# Patient Record
Sex: MALE | Race: WHITE | NOT HISPANIC OR LATINO | URBAN - METROPOLITAN AREA
[De-identification: names, ages, dates, MRNs, and addresses within clinical notes are randomized per-mention and may not be internally consistent; named-entity substitution may affect disease eponyms.]

---

## 2022-04-29 ENCOUNTER — EMERGENCY (EMERGENCY)
Facility: HOSPITAL | Age: 22
LOS: 0 days | Discharge: HOME | End: 2022-04-29
Attending: EMERGENCY MEDICINE | Admitting: EMERGENCY MEDICINE
Payer: COMMERCIAL

## 2022-04-29 VITALS
DIASTOLIC BLOOD PRESSURE: 78 MMHG | SYSTOLIC BLOOD PRESSURE: 134 MMHG | TEMPERATURE: 98 F | OXYGEN SATURATION: 99 % | WEIGHT: 162.04 LBS | HEART RATE: 89 BPM | RESPIRATION RATE: 16 BRPM

## 2022-04-29 DIAGNOSIS — R35.0 FREQUENCY OF MICTURITION: ICD-10-CM

## 2022-04-29 LAB
ALBUMIN SERPL ELPH-MCNC: 5.2 G/DL — SIGNIFICANT CHANGE UP (ref 3.5–5.2)
ALP SERPL-CCNC: 79 U/L — SIGNIFICANT CHANGE UP (ref 30–115)
ALT FLD-CCNC: 50 U/L — HIGH (ref 0–41)
ANION GAP SERPL CALC-SCNC: 12 MMOL/L — SIGNIFICANT CHANGE UP (ref 7–14)
APPEARANCE UR: CLEAR — SIGNIFICANT CHANGE UP
AST SERPL-CCNC: 25 U/L — SIGNIFICANT CHANGE UP (ref 0–41)
BASOPHILS # BLD AUTO: 0.1 K/UL — SIGNIFICANT CHANGE UP (ref 0–0.2)
BASOPHILS NFR BLD AUTO: 1.4 % — HIGH (ref 0–1)
BILIRUB SERPL-MCNC: 0.9 MG/DL — SIGNIFICANT CHANGE UP (ref 0.2–1.2)
BILIRUB UR-MCNC: NEGATIVE — SIGNIFICANT CHANGE UP
BUN SERPL-MCNC: 16 MG/DL — SIGNIFICANT CHANGE UP (ref 10–20)
CALCIUM SERPL-MCNC: 9.6 MG/DL — SIGNIFICANT CHANGE UP (ref 8.5–10.1)
CHLORIDE SERPL-SCNC: 99 MMOL/L — SIGNIFICANT CHANGE UP (ref 98–110)
CO2 SERPL-SCNC: 28 MMOL/L — SIGNIFICANT CHANGE UP (ref 17–32)
COLOR SPEC: YELLOW — SIGNIFICANT CHANGE UP
CREAT SERPL-MCNC: 1 MG/DL — SIGNIFICANT CHANGE UP (ref 0.7–1.5)
DIFF PNL FLD: NEGATIVE — SIGNIFICANT CHANGE UP
EGFR: 108 ML/MIN/1.73M2 — SIGNIFICANT CHANGE UP
EOSINOPHIL # BLD AUTO: 0.13 K/UL — SIGNIFICANT CHANGE UP (ref 0–0.7)
EOSINOPHIL NFR BLD AUTO: 1.8 % — SIGNIFICANT CHANGE UP (ref 0–8)
GLUCOSE SERPL-MCNC: 85 MG/DL — SIGNIFICANT CHANGE UP (ref 70–99)
GLUCOSE UR QL: NEGATIVE MG/DL — SIGNIFICANT CHANGE UP
HCT VFR BLD CALC: 43.5 % — SIGNIFICANT CHANGE UP (ref 42–52)
HGB BLD-MCNC: 15.5 G/DL — SIGNIFICANT CHANGE UP (ref 14–18)
IMM GRANULOCYTES NFR BLD AUTO: 0.3 % — SIGNIFICANT CHANGE UP (ref 0.1–0.3)
KETONES UR-MCNC: NEGATIVE — SIGNIFICANT CHANGE UP
LEUKOCYTE ESTERASE UR-ACNC: NEGATIVE — SIGNIFICANT CHANGE UP
LYMPHOCYTES # BLD AUTO: 3.19 K/UL — SIGNIFICANT CHANGE UP (ref 1.2–3.4)
LYMPHOCYTES # BLD AUTO: 43.5 % — SIGNIFICANT CHANGE UP (ref 20.5–51.1)
MCHC RBC-ENTMCNC: 29.7 PG — SIGNIFICANT CHANGE UP (ref 27–31)
MCHC RBC-ENTMCNC: 35.6 G/DL — SIGNIFICANT CHANGE UP (ref 32–37)
MCV RBC AUTO: 83.3 FL — SIGNIFICANT CHANGE UP (ref 80–94)
MONOCYTES # BLD AUTO: 0.59 K/UL — SIGNIFICANT CHANGE UP (ref 0.1–0.6)
MONOCYTES NFR BLD AUTO: 8 % — SIGNIFICANT CHANGE UP (ref 1.7–9.3)
NEUTROPHILS # BLD AUTO: 3.3 K/UL — SIGNIFICANT CHANGE UP (ref 1.4–6.5)
NEUTROPHILS NFR BLD AUTO: 45 % — SIGNIFICANT CHANGE UP (ref 42.2–75.2)
NITRITE UR-MCNC: NEGATIVE — SIGNIFICANT CHANGE UP
NRBC # BLD: 0 /100 WBCS — SIGNIFICANT CHANGE UP (ref 0–0)
PH UR: 6.5 — SIGNIFICANT CHANGE UP (ref 5–8)
PLATELET # BLD AUTO: 247 K/UL — SIGNIFICANT CHANGE UP (ref 130–400)
POTASSIUM SERPL-MCNC: 4 MMOL/L — SIGNIFICANT CHANGE UP (ref 3.5–5)
POTASSIUM SERPL-SCNC: 4 MMOL/L — SIGNIFICANT CHANGE UP (ref 3.5–5)
PROT SERPL-MCNC: 7.3 G/DL — SIGNIFICANT CHANGE UP (ref 6–8)
PROT UR-MCNC: NEGATIVE MG/DL — SIGNIFICANT CHANGE UP
RBC # BLD: 5.22 M/UL — SIGNIFICANT CHANGE UP (ref 4.7–6.1)
RBC # FLD: 12.8 % — SIGNIFICANT CHANGE UP (ref 11.5–14.5)
SODIUM SERPL-SCNC: 139 MMOL/L — SIGNIFICANT CHANGE UP (ref 135–146)
SP GR SPEC: >=1.03 (ref 1.01–1.03)
UROBILINOGEN FLD QL: 0.2 MG/DL — SIGNIFICANT CHANGE UP
WBC # BLD: 7.33 K/UL — SIGNIFICANT CHANGE UP (ref 4.8–10.8)
WBC # FLD AUTO: 7.33 K/UL — SIGNIFICANT CHANGE UP (ref 4.8–10.8)

## 2022-04-29 PROCEDURE — 99284 EMERGENCY DEPT VISIT MOD MDM: CPT

## 2022-04-29 NOTE — ED PROVIDER NOTE - ATTENDING APP SHARED VISIT CONTRIBUTION OF CARE
20 yo M presents for evaluation of urinary frequency over last month. no dysuria, no excessive thirst but has been drinking caffeine shots and energy drinks. no abd pain. Pt requesting STD testing, On exam pt in NAD AAO x 3, Op clear, MMM, Lungs cta b/l, abd soft nt nd, no edema, no rash

## 2022-04-29 NOTE — ED PROVIDER NOTE - CLINICAL SUMMARY MEDICAL DECISION MAKING FREE TEXT BOX
labs obtained.  Results reviewed and d/w pt.  Pt to decrease caffeine intake.  will follow up with his PMD

## 2022-04-29 NOTE — ED PROVIDER NOTE - PATIENT PORTAL LINK FT
You can access the FollowMyHealth Patient Portal offered by [facility] by registering at the following website: [email or web address]. By joining [employer]’s FollowMyHealth portal, you will also be able to view your health information using other applications (apps) compatible with our system.

## 2022-04-29 NOTE — ED PROVIDER NOTE - NSFOLLOWUPCLINICS_GEN_ALL_ED_FT
[facility]  Infectious Diseases  [address] [place] [address]  Phone: [phone]  Fax: [phone]

## 2022-04-29 NOTE — ED PROVIDER NOTE - PHYSICAL EXAMINATION
Vital Signs: I have reviewed the initial vital signs.  Constitutional: well-nourished, no acute distress, normocephalic  Eyes: PERRLA, EOMI, no nystagmus, clear conjunctiva  Respiratory: unlabored respiratory effort, clear to auscultation bilaterally  Gastrointestinal: soft, non-tender, non-distended  abdomen, no pulsatile mass  Musculoskeletal: supple neck, no cva tenderness, no bony tenderness  Integumentary: warm, dry, no rash  Neurologic: awake, alert, cranial nerves II-XII grossly intact, extremities’ motor and sensory functions grossly intact, no focal deficits,

## 2022-04-29 NOTE — ED PROVIDER NOTE - OBJECTIVE STATEMENT
20 y/o male presents to the ED with urinary frequency over past two months . patient has been drinking coffee and energy caffeine drinks daily. patient denies any abdominal or back pain. no penile discharge or dysuria.

## 2022-04-29 NOTE — ED PROVIDER NOTE - NS ED ATTENDING STATEMENT MOD
This was a shared visit with the MARIA GUADALUPE. I reviewed and verified the documentation and independently performed the documented:

## 2022-04-30 LAB — T PALLIDUM AB TITR SER: NEGATIVE — SIGNIFICANT CHANGE UP

## 2022-05-02 LAB
C TRACH RRNA SPEC QL NAA+PROBE: SIGNIFICANT CHANGE UP
N GONORRHOEA RRNA SPEC QL NAA+PROBE: SIGNIFICANT CHANGE UP
SPECIMEN SOURCE: SIGNIFICANT CHANGE UP

## 2023-08-05 ENCOUNTER — INPATIENT (INPATIENT)
Facility: HOSPITAL | Age: 23
LOS: 2 days | Discharge: HOME | DRG: 371 | End: 2023-08-08
Attending: STUDENT IN AN ORGANIZED HEALTH CARE EDUCATION/TRAINING PROGRAM | Admitting: STUDENT IN AN ORGANIZED HEALTH CARE EDUCATION/TRAINING PROGRAM
Payer: COMMERCIAL

## 2023-08-05 VITALS
DIASTOLIC BLOOD PRESSURE: 75 MMHG | HEART RATE: 109 BPM | SYSTOLIC BLOOD PRESSURE: 134 MMHG | RESPIRATION RATE: 20 BRPM | HEIGHT: 72 IN | WEIGHT: 151.02 LBS | TEMPERATURE: 98 F | OXYGEN SATURATION: 99 %

## 2023-08-05 LAB
ALBUMIN SERPL ELPH-MCNC: 4.3 G/DL — SIGNIFICANT CHANGE UP (ref 3.5–5)
ALP SERPL-CCNC: 69 U/L — SIGNIFICANT CHANGE UP (ref 40–120)
ALT FLD-CCNC: 52 U/L DA — SIGNIFICANT CHANGE UP (ref 10–60)
ANION GAP SERPL CALC-SCNC: 6 MMOL/L — SIGNIFICANT CHANGE UP (ref 5–17)
APPEARANCE UR: CLEAR — SIGNIFICANT CHANGE UP
APTT BLD: 28.9 SEC — SIGNIFICANT CHANGE UP (ref 24.5–35.6)
AST SERPL-CCNC: 41 U/L — HIGH (ref 10–40)
BACTERIA # UR AUTO: ABNORMAL /HPF
BASOPHILS # BLD AUTO: 0.11 K/UL — SIGNIFICANT CHANGE UP (ref 0–0.2)
BASOPHILS NFR BLD AUTO: 1.2 % — SIGNIFICANT CHANGE UP (ref 0–2)
BILIRUB SERPL-MCNC: 1.3 MG/DL — HIGH (ref 0.2–1.2)
BILIRUB UR-MCNC: NEGATIVE — SIGNIFICANT CHANGE UP
BUN SERPL-MCNC: 23 MG/DL — HIGH (ref 7–18)
CALCIUM SERPL-MCNC: 9.5 MG/DL — SIGNIFICANT CHANGE UP (ref 8.4–10.5)
CHLORIDE SERPL-SCNC: 106 MMOL/L — SIGNIFICANT CHANGE UP (ref 96–108)
CO2 SERPL-SCNC: 26 MMOL/L — SIGNIFICANT CHANGE UP (ref 22–31)
COLOR SPEC: YELLOW — SIGNIFICANT CHANGE UP
CREAT SERPL-MCNC: 1.08 MG/DL — SIGNIFICANT CHANGE UP (ref 0.5–1.3)
DIFF PNL FLD: NEGATIVE — SIGNIFICANT CHANGE UP
EGFR: 98 ML/MIN/1.73M2 — SIGNIFICANT CHANGE UP
EOSINOPHIL # BLD AUTO: 0.03 K/UL — SIGNIFICANT CHANGE UP (ref 0–0.5)
EOSINOPHIL NFR BLD AUTO: 0.3 % — SIGNIFICANT CHANGE UP (ref 0–6)
EPI CELLS # UR: ABNORMAL /HPF
GLUCOSE SERPL-MCNC: 95 MG/DL — SIGNIFICANT CHANGE UP (ref 70–99)
GLUCOSE UR QL: NEGATIVE — SIGNIFICANT CHANGE UP
HCT VFR BLD CALC: 40.7 % — SIGNIFICANT CHANGE UP (ref 39–50)
HGB BLD-MCNC: 14.3 G/DL — SIGNIFICANT CHANGE UP (ref 13–17)
IMM GRANULOCYTES NFR BLD AUTO: 0.1 % — SIGNIFICANT CHANGE UP (ref 0–0.9)
INR BLD: 1 RATIO — SIGNIFICANT CHANGE UP (ref 0.85–1.18)
KETONES UR-MCNC: ABNORMAL
LEUKOCYTE ESTERASE UR-ACNC: NEGATIVE — SIGNIFICANT CHANGE UP
LIDOCAIN IGE QN: 366 U/L — SIGNIFICANT CHANGE UP (ref 73–393)
LYMPHOCYTES # BLD AUTO: 2.93 K/UL — SIGNIFICANT CHANGE UP (ref 1–3.3)
LYMPHOCYTES # BLD AUTO: 32.7 % — SIGNIFICANT CHANGE UP (ref 13–44)
MCHC RBC-ENTMCNC: 29.4 PG — SIGNIFICANT CHANGE UP (ref 27–34)
MCHC RBC-ENTMCNC: 35.1 GM/DL — SIGNIFICANT CHANGE UP (ref 32–36)
MCV RBC AUTO: 83.7 FL — SIGNIFICANT CHANGE UP (ref 80–100)
MONOCYTES # BLD AUTO: 0.9 K/UL — SIGNIFICANT CHANGE UP (ref 0–0.9)
MONOCYTES NFR BLD AUTO: 10 % — SIGNIFICANT CHANGE UP (ref 2–14)
NEUTROPHILS # BLD AUTO: 4.98 K/UL — SIGNIFICANT CHANGE UP (ref 1.8–7.4)
NEUTROPHILS NFR BLD AUTO: 55.7 % — SIGNIFICANT CHANGE UP (ref 43–77)
NITRITE UR-MCNC: NEGATIVE — SIGNIFICANT CHANGE UP
NRBC # BLD: 0 /100 WBCS — SIGNIFICANT CHANGE UP (ref 0–0)
PH UR: 6.5 — SIGNIFICANT CHANGE UP (ref 5–8)
PLATELET # BLD AUTO: 271 K/UL — SIGNIFICANT CHANGE UP (ref 150–400)
POTASSIUM SERPL-MCNC: 3.3 MMOL/L — LOW (ref 3.5–5.3)
POTASSIUM SERPL-SCNC: 3.3 MMOL/L — LOW (ref 3.5–5.3)
PROT SERPL-MCNC: 7.8 G/DL — SIGNIFICANT CHANGE UP (ref 6–8.3)
PROT UR-MCNC: 15 MG/DL
PROTHROM AB SERPL-ACNC: 11.4 SEC — SIGNIFICANT CHANGE UP (ref 9.5–13)
RBC # BLD: 4.86 M/UL — SIGNIFICANT CHANGE UP (ref 4.2–5.8)
RBC # FLD: 12.8 % — SIGNIFICANT CHANGE UP (ref 10.3–14.5)
RBC CASTS # UR COMP ASSIST: SIGNIFICANT CHANGE UP /HPF (ref 0–2)
SODIUM SERPL-SCNC: 138 MMOL/L — SIGNIFICANT CHANGE UP (ref 135–145)
SP GR SPEC: 1.01 — SIGNIFICANT CHANGE UP (ref 1.01–1.02)
UROBILINOGEN FLD QL: NEGATIVE — SIGNIFICANT CHANGE UP
WBC # BLD: 8.96 K/UL — SIGNIFICANT CHANGE UP (ref 3.8–10.5)
WBC # FLD AUTO: 8.96 K/UL — SIGNIFICANT CHANGE UP (ref 3.8–10.5)
WBC UR QL: SIGNIFICANT CHANGE UP /HPF (ref 0–5)

## 2023-08-05 PROCEDURE — 74177 CT ABD & PELVIS W/CONTRAST: CPT | Mod: 26,MA

## 2023-08-05 PROCEDURE — 99285 EMERGENCY DEPT VISIT HI MDM: CPT

## 2023-08-05 RX ORDER — ONDANSETRON 8 MG/1
4 TABLET, FILM COATED ORAL ONCE
Refills: 0 | Status: COMPLETED | OUTPATIENT
Start: 2023-08-05 | End: 2023-08-05

## 2023-08-05 RX ORDER — SODIUM CHLORIDE 9 MG/ML
1000 INJECTION INTRAMUSCULAR; INTRAVENOUS; SUBCUTANEOUS ONCE
Refills: 0 | Status: COMPLETED | OUTPATIENT
Start: 2023-08-05 | End: 2023-08-05

## 2023-08-05 RX ORDER — MORPHINE SULFATE 50 MG/1
4 CAPSULE, EXTENDED RELEASE ORAL ONCE
Refills: 0 | Status: DISCONTINUED | OUTPATIENT
Start: 2023-08-05 | End: 2023-08-05

## 2023-08-05 RX ORDER — PANTOPRAZOLE SODIUM 20 MG/1
40 TABLET, DELAYED RELEASE ORAL ONCE
Refills: 0 | Status: COMPLETED | OUTPATIENT
Start: 2023-08-05 | End: 2023-08-05

## 2023-08-05 RX ORDER — KETOROLAC TROMETHAMINE 30 MG/ML
30 SYRINGE (ML) INJECTION ONCE
Refills: 0 | Status: DISCONTINUED | OUTPATIENT
Start: 2023-08-05 | End: 2023-08-05

## 2023-08-05 RX ORDER — MORPHINE SULFATE 50 MG/1
2 CAPSULE, EXTENDED RELEASE ORAL ONCE
Refills: 0 | Status: DISCONTINUED | OUTPATIENT
Start: 2023-08-05 | End: 2023-08-05

## 2023-08-05 RX ORDER — FAMOTIDINE 10 MG/ML
20 INJECTION INTRAVENOUS ONCE
Refills: 0 | Status: COMPLETED | OUTPATIENT
Start: 2023-08-05 | End: 2023-08-05

## 2023-08-05 RX ORDER — SUCRALFATE 1 G
1 TABLET ORAL ONCE
Refills: 0 | Status: COMPLETED | OUTPATIENT
Start: 2023-08-05 | End: 2023-08-05

## 2023-08-05 RX ADMIN — Medication 30 MILLIGRAM(S): at 20:39

## 2023-08-05 RX ADMIN — Medication 30 MILLIGRAM(S): at 20:15

## 2023-08-05 RX ADMIN — SODIUM CHLORIDE 1000 MILLILITER(S): 9 INJECTION INTRAMUSCULAR; INTRAVENOUS; SUBCUTANEOUS at 20:15

## 2023-08-05 RX ADMIN — ONDANSETRON 4 MILLIGRAM(S): 8 TABLET, FILM COATED ORAL at 20:15

## 2023-08-05 RX ADMIN — Medication 30 MILLILITER(S): at 20:28

## 2023-08-05 RX ADMIN — MORPHINE SULFATE 2 MILLIGRAM(S): 50 CAPSULE, EXTENDED RELEASE ORAL at 21:02

## 2023-08-05 RX ADMIN — SODIUM CHLORIDE 1000 MILLILITER(S): 9 INJECTION INTRAMUSCULAR; INTRAVENOUS; SUBCUTANEOUS at 21:00

## 2023-08-05 RX ADMIN — MORPHINE SULFATE 2 MILLIGRAM(S): 50 CAPSULE, EXTENDED RELEASE ORAL at 20:43

## 2023-08-05 RX ADMIN — MORPHINE SULFATE 4 MILLIGRAM(S): 50 CAPSULE, EXTENDED RELEASE ORAL at 23:50

## 2023-08-05 RX ADMIN — Medication 1 GRAM(S): at 20:28

## 2023-08-05 RX ADMIN — FAMOTIDINE 20 MILLIGRAM(S): 10 INJECTION INTRAVENOUS at 20:29

## 2023-08-05 NOTE — ED ADULT NURSE NOTE - OBJECTIVE STATEMENT
pt reports of left sided abdominal pain radiating to left flank area with painful urination x a month but got worse today. hx of inguinal hernia. [id number] ao4. facial grimacing and guarding noted. abdomen soft, tender to left abd area.

## 2023-08-05 NOTE — ED PROVIDER NOTE - CLINICAL SUMMARY MEDICAL DECISION MAKING FREE TEXT BOX
Patient presenting with abd pain. will obtain lab, ct, r/o stone, surgical abd. pain control and reassess

## 2023-08-05 NOTE — ED PROVIDER NOTE - OBJECTIVE STATEMENT
22 y.o presenting with left sided abd pain intermittent x 1 month but worse today. denies n, v, fever, dysuria, hematuria, diarrhea. denies pmh or psh

## 2023-08-05 NOTE — ED PROVIDER NOTE - PROGRESS NOTE DETAILS
Sabrina: ct with ileus vs early obstruction. surgery consulted- advised unlikely sbo, no need for surgical admission, needs GI eval. with morphine pain is improved, but then pain returning. will admit for eval for abd pain.

## 2023-08-06 DIAGNOSIS — R10.9 UNSPECIFIED ABDOMINAL PAIN: ICD-10-CM

## 2023-08-06 DIAGNOSIS — Z98.890 OTHER SPECIFIED POSTPROCEDURAL STATES: Chronic | ICD-10-CM

## 2023-08-06 DIAGNOSIS — E87.6 HYPOKALEMIA: ICD-10-CM

## 2023-08-06 DIAGNOSIS — Z92.241 PERSONAL HISTORY OF SYSTEMIC STEROID THERAPY: ICD-10-CM

## 2023-08-06 DIAGNOSIS — R94.31 ABNORMAL ELECTROCARDIOGRAM [ECG] [EKG]: ICD-10-CM

## 2023-08-06 DIAGNOSIS — Z29.9 ENCOUNTER FOR PROPHYLACTIC MEASURES, UNSPECIFIED: ICD-10-CM

## 2023-08-06 DIAGNOSIS — F10.10 ALCOHOL ABUSE, UNCOMPLICATED: ICD-10-CM

## 2023-08-06 PROBLEM — Z78.9 OTHER SPECIFIED HEALTH STATUS: Chronic | Status: ACTIVE | Noted: 2022-04-29

## 2023-08-06 LAB
ANION GAP SERPL CALC-SCNC: 7 MMOL/L — SIGNIFICANT CHANGE UP (ref 5–17)
BUN SERPL-MCNC: 14 MG/DL — SIGNIFICANT CHANGE UP (ref 7–18)
CALCIUM SERPL-MCNC: 8.6 MG/DL — SIGNIFICANT CHANGE UP (ref 8.4–10.5)
CHLORIDE SERPL-SCNC: 109 MMOL/L — HIGH (ref 96–108)
CO2 SERPL-SCNC: 26 MMOL/L — SIGNIFICANT CHANGE UP (ref 22–31)
CREAT SERPL-MCNC: 0.91 MG/DL — SIGNIFICANT CHANGE UP (ref 0.5–1.3)
EGFR: 121 ML/MIN/1.73M2 — SIGNIFICANT CHANGE UP
ERYTHROCYTE [SEDIMENTATION RATE] IN BLOOD: 7 MM/HR — SIGNIFICANT CHANGE UP (ref 0–15)
ETHANOL SERPL-MCNC: <3 MG/DL — SIGNIFICANT CHANGE UP (ref 0–10)
GLUCOSE SERPL-MCNC: 120 MG/DL — HIGH (ref 70–99)
HCT VFR BLD CALC: 42.2 % — SIGNIFICANT CHANGE UP (ref 39–50)
HGB BLD-MCNC: 14.5 G/DL — SIGNIFICANT CHANGE UP (ref 13–17)
MCHC RBC-ENTMCNC: 29.2 PG — SIGNIFICANT CHANGE UP (ref 27–34)
MCHC RBC-ENTMCNC: 34.4 GM/DL — SIGNIFICANT CHANGE UP (ref 32–36)
MCV RBC AUTO: 85.1 FL — SIGNIFICANT CHANGE UP (ref 80–100)
NRBC # BLD: 0 /100 WBCS — SIGNIFICANT CHANGE UP (ref 0–0)
PLATELET # BLD AUTO: 250 K/UL — SIGNIFICANT CHANGE UP (ref 150–400)
POTASSIUM SERPL-MCNC: 3.5 MMOL/L — SIGNIFICANT CHANGE UP (ref 3.5–5.3)
POTASSIUM SERPL-SCNC: 3.5 MMOL/L — SIGNIFICANT CHANGE UP (ref 3.5–5.3)
RBC # BLD: 4.96 M/UL — SIGNIFICANT CHANGE UP (ref 4.2–5.8)
RBC # FLD: 13.2 % — SIGNIFICANT CHANGE UP (ref 10.3–14.5)
SODIUM SERPL-SCNC: 142 MMOL/L — SIGNIFICANT CHANGE UP (ref 135–145)
WBC # BLD: 5.35 K/UL — SIGNIFICANT CHANGE UP (ref 3.8–10.5)
WBC # FLD AUTO: 5.35 K/UL — SIGNIFICANT CHANGE UP (ref 3.8–10.5)

## 2023-08-06 PROCEDURE — 93010 ELECTROCARDIOGRAM REPORT: CPT | Mod: 76

## 2023-08-06 PROCEDURE — 99222 1ST HOSP IP/OBS MODERATE 55: CPT | Mod: GC

## 2023-08-06 RX ORDER — ONDANSETRON 8 MG/1
4 TABLET, FILM COATED ORAL EVERY 6 HOURS
Refills: 0 | Status: DISCONTINUED | OUTPATIENT
Start: 2023-08-06 | End: 2023-08-06

## 2023-08-06 RX ORDER — SUCRALFATE 1 G
1 TABLET ORAL
Refills: 0 | Status: DISCONTINUED | OUTPATIENT
Start: 2023-08-06 | End: 2023-08-08

## 2023-08-06 RX ORDER — MORPHINE SULFATE 50 MG/1
2 CAPSULE, EXTENDED RELEASE ORAL EVERY 6 HOURS
Refills: 0 | Status: DISCONTINUED | OUTPATIENT
Start: 2023-08-06 | End: 2023-08-08

## 2023-08-06 RX ORDER — ACETAMINOPHEN 500 MG
1000 TABLET ORAL ONCE
Refills: 0 | Status: COMPLETED | OUTPATIENT
Start: 2023-08-06 | End: 2023-08-06

## 2023-08-06 RX ORDER — SUCRALFATE 1 G
1 TABLET ORAL ONCE
Refills: 0 | Status: COMPLETED | OUTPATIENT
Start: 2023-08-06 | End: 2023-08-06

## 2023-08-06 RX ORDER — PANTOPRAZOLE SODIUM 20 MG/1
40 TABLET, DELAYED RELEASE ORAL EVERY 12 HOURS
Refills: 0 | Status: DISCONTINUED | OUTPATIENT
Start: 2023-08-06 | End: 2023-08-08

## 2023-08-06 RX ORDER — POTASSIUM CHLORIDE 20 MEQ
20 PACKET (EA) ORAL ONCE
Refills: 0 | Status: COMPLETED | OUTPATIENT
Start: 2023-08-06 | End: 2023-08-06

## 2023-08-06 RX ORDER — ACETAMINOPHEN 500 MG
650 TABLET ORAL EVERY 6 HOURS
Refills: 0 | Status: DISCONTINUED | OUTPATIENT
Start: 2023-08-06 | End: 2023-08-08

## 2023-08-06 RX ADMIN — Medication 650 MILLIGRAM(S): at 20:44

## 2023-08-06 RX ADMIN — PANTOPRAZOLE SODIUM 40 MILLIGRAM(S): 20 TABLET, DELAYED RELEASE ORAL at 00:15

## 2023-08-06 RX ADMIN — MORPHINE SULFATE 2 MILLIGRAM(S): 50 CAPSULE, EXTENDED RELEASE ORAL at 23:51

## 2023-08-06 RX ADMIN — Medication 650 MILLIGRAM(S): at 22:11

## 2023-08-06 RX ADMIN — Medication 1 GRAM(S): at 13:54

## 2023-08-06 RX ADMIN — PANTOPRAZOLE SODIUM 40 MILLIGRAM(S): 20 TABLET, DELAYED RELEASE ORAL at 05:19

## 2023-08-06 RX ADMIN — MORPHINE SULFATE 2 MILLIGRAM(S): 50 CAPSULE, EXTENDED RELEASE ORAL at 17:25

## 2023-08-06 RX ADMIN — Medication 1 GRAM(S): at 17:08

## 2023-08-06 RX ADMIN — Medication 20 MILLIEQUIVALENT(S): at 05:19

## 2023-08-06 RX ADMIN — MORPHINE SULFATE 2 MILLIGRAM(S): 50 CAPSULE, EXTENDED RELEASE ORAL at 10:07

## 2023-08-06 RX ADMIN — MORPHINE SULFATE 2 MILLIGRAM(S): 50 CAPSULE, EXTENDED RELEASE ORAL at 09:53

## 2023-08-06 RX ADMIN — Medication 1 GRAM(S): at 23:27

## 2023-08-06 RX ADMIN — PANTOPRAZOLE SODIUM 40 MILLIGRAM(S): 20 TABLET, DELAYED RELEASE ORAL at 17:08

## 2023-08-06 RX ADMIN — MORPHINE SULFATE 4 MILLIGRAM(S): 50 CAPSULE, EXTENDED RELEASE ORAL at 00:11

## 2023-08-06 RX ADMIN — Medication 400 MILLIGRAM(S): at 13:54

## 2023-08-06 RX ADMIN — Medication 1000 MILLIGRAM(S): at 14:13

## 2023-08-06 RX ADMIN — MORPHINE SULFATE 2 MILLIGRAM(S): 50 CAPSULE, EXTENDED RELEASE ORAL at 17:08

## 2023-08-06 RX ADMIN — FAMOTIDINE 20 MILLIGRAM(S): 10 INJECTION INTRAVENOUS at 00:16

## 2023-08-06 NOTE — H&P ADULT - PROBLEM SELECTOR PLAN 2
p/w mild hypoK of 3.3  will provide PO KCl   monitor CMP p/w mild hypoK of 3.3  will provide PO KCl 20  monitor CMP

## 2023-08-06 NOTE — H&P ADULT - PROBLEM SELECTOR PLAN 3
hx of testosterone use for 7 months from outside market, sudden quit   loss of muscle mass and energy , with decrease exercise tolerance  [studies show low testosterone correlation with IBS]   f/u total testosterone in AM

## 2023-08-06 NOTE — CONSULT NOTE ADULT - ASSESSMENT
22 y.o male with no sign PMHx or PSHx presents to the ED with Abd pain x 1 month . Admits pain to be sharp, severe,  mostly  epigastric/periumbilical relieved by PPIs and food but  denies nausea or vomiting. Pt is in the ED today because pt is getting increasingly worse . 22 y.o male with no sign PMHx or PSHx presents to the ED with Abd pain x 1 month . Admits pain to be sharp, severe,  mostly  epigastric/periumbilical relieved by PPIs and food but  denies nausea or vomiting. Pt is in the ED today because pt is getting increasingly worse . Afebrile , Chronic Abd pain relieved by food or PPI . Suspect PUD . No SBO otr Ileus . Normal caliber BM     No immediate surgical intervention   Suggest GI cocktails   PO trial /challenge   Will benefit from GI consult   Other care pre primary team  Reconsult surgery prn

## 2023-08-06 NOTE — H&P ADULT - NSHPPHYSICALEXAM_GEN_ALL_CORE
GENERAL: NAD, lying in bed comfortably  HEAD:  Atraumatic, Normocephalic  EYES: EOMI, PERRLA, conjunctiva and sclera clear  ENT: Moist mucous membranes  NECK: Supple, No JVD  CHEST/LUNG: Clear to auscultation bilaterally; No rales, rhonchi, wheezing, or rubs. Unlabored respirations  HEART: Regular rate and rhythm; No murmurs, rubs, or gallops  ABDOMEN: Bowel sounds present; Soft, Nontender, Nondistended, scaphoid abdomen   EXTREMITIES:  2+ Peripheral Pulses, brisk capillary refill. No clubbing, cyanosis, or edema  NERVOUS SYSTEM:  Alert & Oriented X3, speech clear. 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

## 2023-08-06 NOTE — H&P ADULT - PROBLEM SELECTOR PLAN 1
p/w epigastric and LUQ, soft stool, loss of weight  in the ED: afebrile, , /75, Sat 99% on RA  endorsed using Testosterone injection daily from OCT 2022 for 7 months with sudden quit   no leukocytosis, neg lipase,   LFTs relatively normal   CT scan: Mildly dilated small bowel loops without abrupt transition point identified, may reflect ileus.  likely PUD [duodenal] 2/2 H. pylor vs Gastritis vs IBS 2/2 steroids use and quit hormonal therapy  vs SMA syndrome  [studies show low testosterone correlation with IBS]   s/p Pepcid, Toradol and Morphine   c/w PPI PO BID  f/u H. pylori Ab in stool and blood   f/u stool culture, ova and parasite  Surgery consulted by ED - no surgical intervention at this time   GI consult in AM for possible EGD p/w epigastric and LUQ, soft stool, loss of weight  in the ED: afebrile, , /75, Sat 99% on RA  endorsed using Testosterone injection daily from OCT 2022 for 7 months with sudden quit   no leukocytosis, neg lipase,   LFTs relatively normal   CT scan: Mildly dilated small bowel loops without abrupt transition point identified, may reflect ileus.  likely PUD [duodenal] 2/2 H. pylor vs Gastritis vs IBS 2/2 steroids use and quit hormonal therapy  vs SMA syndrome  [studies show low testosterone correlation with IBS]   s/p Pepcid, Toradol and Morphine   c/w PPI PO BID  will hold off on Zofran as EKG showed QTc of 520 - to reassess with EKG if antiemetic required   f/u H. pylori Ab in stool and blood   f/u stool culture, ova and parasite  Surgery consulted by ED - no surgical intervention at this time   GI consult in AM for possible EGD p/w epigastric and LUQ pain , soft stool, loss of weight  in the ED: afebrile, , /75, Sat 99% on RA  endorsed using Testosterone injection daily from OCT 2022 for 7 months with sudden quit   no leukocytosis, neg lipase,   LFTs relatively normal   CT scan: Mildly dilated small bowel loops without abrupt transition point identified, may reflect ileus.  likely PUD [duodenal] 2/2 H. pylori vs Gastritis vs IBS 2/2 steroids use and quit hormonal therapy  vs SMA syndrome  [studies show low testosterone correlation with IBS]   s/p Pepcid, Toradol and Morphine   c/w PPI PO BID  will hold off on Zofran as EKG showed QTc of 520 - to reassess with EKG if antiemetic required   f/u H. pylori Ab in stool and blood   f/u stool culture, ova and parasite  Surgery consulted by ED - no surgical intervention at this time   GI consult in AM for possible EGD

## 2023-08-06 NOTE — H&P ADULT - PROBLEM SELECTOR PLAN 4
DVT - SCD hx of heavy alcohol drinking in the past, cut down to 3 shots of Vodka and 3 bottles of bear on weekends  started drinking beer 1-3 bottles daily in the past 5 days   ciwa protocol with PRN ativan 2 mg IV every 1 hr for CIWA score greater than 8   f/u alcohol blood in AM hx of heavy alcohol drinking in the past, cut down to 3 shots of Vodka and 3 bottles of bear on weekends  started drinking beer 1-3 bottles daily in the past 5 days   not in withdrawal, CIWA score 0 as per writer's exam   ciwa protocol with PRN ativan 2 mg IV every 1 hr for CIWA score greater than 8   f/u alcohol blood in AM EKG showed QTc of 520   s/p 2 doses of Zofran in ED  hx of palpitation in childhood requiring evaluation with Holter monitoring  questionable if Zofran alone would cause prolongation   will repeat EKG in AM  will hold off on Antiemetics or other drugs causing QTc prolongation such as Zofran, Metoclopramide  may consider Ativan or other antiemetic based on pharmacy availability EKG showed QTc of 520   s/p 2 doses of Zofran in ED  hx of palpitation in childhood requiring evaluation with Holter monitoring  questionable if Zofran alone would cause prolongation   will repeat EKG in AM  will hold off on Antiemetics or other drugs causing QTc prolongation such as Zofran, Metoclopramide  may consider Ativan or other antiemetic based on pharmacy availability  Consider outpatient cardiology follow up if qtc remains elevated

## 2023-08-06 NOTE — PATIENT PROFILE ADULT - NSTOBACCOQUITATTEMPT_GEN_A_CORE_SD
Stopped smoking cigarettes 11 years ago. Now vapes "24x7"/nicotine replacement therapy/tobacco cessation program

## 2023-08-06 NOTE — CONSULT NOTE ADULT - NS ATTEND AMEND GEN_ALL_CORE FT
Clinical history most consistent with PUD.   Continue PPI    GI consult for endosopy - in vs outpatient  Would d/c narcotics

## 2023-08-06 NOTE — H&P ADULT - HISTORY OF PRESENT ILLNESS
22 yrs old M, with pmhx of alcohol abuse, pshx hernia repair in childhood, presented with epigastrica and LUQ abdominal pain. Pt stated that the pain started about 2 months ago, explained as stabbing pain, mainly at the epigastric area with gradual worsening with increased in intensity and number of episodes, relatively improved with food, not controlled with Tylenol. He stated that the episode he experienced today was excruciating, radiating to the left lower back associated with mild nausea. Pt denied any vomiting, chest pain, headache, dizziness, urinary symptoms, constipation, hematemesis, hematochezia, melena. He reported of having multiple soft stools about 5 times daily for the same duration of time, along with loss of weight about 30 pounds in the last month. 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 Pt stated that the pain started about 2 months ago, explained as stabbing pain, mainly at the epigastric area with gradual worsening with increased in intensity and number of episodes, relatively improved with food, not controlled with Tylenol. He stated that the episode he experienced today was excruciating, radiating to the left lower back associated with mild nausea. Pt denied any vomiting, chest pain, headache, dizziness, urinary symptoms, constipation, hematemesis, hematochezia, melena. He reported of having multiple soft stools about 5 times daily for the same duration of time, along with loss of weight about 30 pounds in the last month. 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 He went on a Cruise to [place] about 5 months ago and had local food. He denied any sick contacts or similar symptoms in the family but stated that his mother was diagnose with the H. pylori in the past.   He has noticed dyspnea and palpitation for the same duration of time especially when climbing up the stairs. Reported of having palpitation episodes in childhood when he had Holter monitoring twice with the latest being done when he was 15 yrs old which was inconclusive.   Pt was a former heavy alcohol drinker, however had cut down in the past couple of years, currently he uses about 3 shots of Vodka and 3 bottles of beer on the weekends, however in the past few days, he tried having beer about 1-3 beers daily for the past 4-5 days as it makes him feel full. Pt is a former smoker as well, but has been Vaping daily and using marijuana as well, denied use of other illicit drugs including Cocaine, Heroine, etc.   Pt reported of Lung cancer in the grandfather however denied any other type of cancer including gastric/colon cancer.

## 2023-08-06 NOTE — H&P ADULT - SOCIAL HISTORY: ALCOHOL USE
Pt was a former heavy alcohol drinker, however had cut down in the past couple of years, currently he uses about 3 shots of Vodka and 3 bottles of beer on the weekends, however in the past few days, he tried having beer about 1-3 beers daily for the past 4-5 days as it makes him feel full.

## 2023-08-06 NOTE — H&P ADULT - NSHPREVIEWOFSYSTEMS_GEN_ALL_CORE
REVIEW OF SYSTEMS:    CONSTITUTIONAL: No weakness, fevers or chills  EYES/ENT: No visual changes;  No vertigo or throat pain   NECK: No pain or stiffness  RESPIRATORY: No cough, wheezing, hemoptysis; + shortness of breath  CARDIOVASCULAR: No chest pain or palpitations  GASTROINTESTINAL: + abdominal or epigastric pain. + mild nausea, no vomiting, or hematemesis; No diarrhea or constipation. No melena or hematochezia.  GENITOURINARY: No dysuria, frequency or hematuria  NEUROLOGICAL: No numbness or weakness  SKIN: No itching, rashes REVIEW OF SYSTEMS:    CONSTITUTIONAL: No weakness, fevers or chills  EYES/ENT: No visual changes;  No vertigo or throat pain   NECK: No pain or stiffness  RESPIRATORY: No cough, wheezing, hemoptysis; + intermittent shortness of breath  CARDIOVASCULAR: No chest pain or palpitations  GASTROINTESTINAL: + abdominal or epigastric pain. + mild nausea, no vomiting, or hematemesis; No diarrhea or constipation. No melena or hematochezia.  GENITOURINARY: No dysuria, frequency or hematuria  NEUROLOGICAL: No numbness or weakness  SKIN: No itching, rashes

## 2023-08-06 NOTE — H&P ADULT - PROBLEM SELECTOR PLAN 5
DVT - SCD hx of heavy alcohol drinking in the past, cut down to 3 shots of Vodka and 3 bottles of bear on weekends  started drinking beer 1-3 bottles daily in the past 5 days   not in withdrawal, CIWA score 0 as per writer's exam   ciwa protocol with PRN ativan 2 mg IV every 1 hr for CIWA score greater than 8   f/u alcohol blood in AM

## 2023-08-06 NOTE — H&P ADULT - ASSESSMENT
22 yrs old M, with pmhx of alcohol abuse, pshx hernia repair in childhood, presented with epigastrica and LUQ abdominal pain. Pt is admitted for abdominal pain assessment.  22 yrs old M, with pmhx of alcohol abuse, pshx hernia repair in childhood, presented with epigastric and LUQ abdominal pain. Pt is admitted for abdominal pain assessment.

## 2023-08-06 NOTE — H&P ADULT - ATTENDING COMMENTS
Patient is a 22 year old male with hx of alcohol abuse, past surgical history of left hernia repair in childhood, presenting with epigastric and left upper quadrant pain. Patient states that he first noticed the pain about 2 months ago, however he reports the pain was excruciating today, prompting his ED visit. He describes it as a stabbing pain at the epigastric and LUQ area with radiation to the left lower back. He reports exacerbation when drinking coffee or if he has gone a long time without eating. He finds relief with food; however, the pain has left him with decreased appetite and he has noticed a 30 lb weight loss over past 2 months. He has associated nausea, but otherwise denies vomiting, chest pain, cough, diarrhea, consitpation, hematemesis, hematochezia or melena. He reports about 5 bowel movements a day, predominantly solid stool. Patient is a , predominantly working with iron. He admits to anabolic steroid, purchased from the streets, use from Oct - May in order to "get bigger and stronger" for work. He also reports going on a cruise to [place] around 5 months ago, states he predominately ate food from the cruise, but had some local food; denies any illness from that trip. In addition, he reports hx of ulcers in his mother 2/2 H. pylori. He admits to heavy etoh use, previously drinking "half a bottle of Bhavin" but now drinks about 3 vodka shots on the weekends with additional beers. He does admit to drinking several beers daily over the past week as the bloating sensation helps with his abdominal discomfort.     In the ED, patient's vitals were stable. Labs significant for hypokalemia of 3.3. CT A/P with mildly dilated small bowel loops without abrupt transition point   identified, may reflect ileus. Early/developing obstruction should be excluded clinically. ED consulted surgery who doesn't believe there to be SBO or ileus; suspecting PUD, recommends conservative management.     ROS and PE as above     Labs, imaging and ekg reviewed  EKG with NSR. Nonspecific ST-T wave abnormalities. Prolonged Qtc 523.    CT Abdomen and Pelvis w/ IV Cont   IMPRESSION:  Mildly dilated small bowel loops without abrupt transition point   identified, may reflect ileus. Early/developing obstruction should be   excluded clinically.    A/P  #Abdominal pain  #Hypokalemia  #Alcohol abuse  #Hx of anabolic steroid use  #Prophylactic measures    - Admit to medicine  - Given his history and presentation, I suspect PUD to be high on the differential. CT abd/pel Patient is a 22 year old male with hx of alcohol abuse, past surgical history of left hernia repair in childhood, presenting with epigastric and left upper quadrant pain. Patient states that he first noticed the pain about 2 months ago, however he reports the pain was excruciating today, prompting his ED visit. He describes it as a stabbing pain at the epigastric and LUQ area with radiation to the left lower back. He reports exacerbation when drinking coffee or if he has gone a long time without eating. He finds relief with food; however, the pain has left him with decreased appetite and he has noticed a 30 lb weight loss over past 2 months. He has associated nausea, but otherwise denies vomiting, chest pain, cough, diarrhea, constipation, hematemesis, hematochezia or melena. He reports about 5 bowel movements a day, predominantly solid stool. Patient is a , predominantly working with iron. He admits to anabolic steroid, purchased from the streets, use from Oct - May in order to "get bigger and stronger" for work. He also reports going on a cruise to [place] around 5 months ago, states he predominately ate food from the cruise, but had some local food; denies any illness from that trip. In addition, he reports hx of ulcers in his mother 2/2 H. pylori. He admits to heavy etoh use, previously drinking "half a bottle of Bhavin" but now drinks about 3 vodka shots on the weekends with additional beers. He does admit to drinking several beers daily over the past week as the bloating sensation helps with his abdominal discomfort.     In the ED, patient's vitals were stable. Labs significant for hypokalemia of 3.3. CT A/P with mildly dilated small bowel loops without abrupt transition point   identified, may reflect ileus. Early/developing obstruction should be excluded clinically. ED consulted surgery who doesn't believe there to be SBO or ileus; suspecting PUD, recommends conservative management.     ROS and PE as above     Labs, imaging and ekg reviewed  EKG with NSR. Nonspecific ST-T wave abnormalities. Prolonged Qtc 523.    CT Abdomen and Pelvis w/ IV Cont   IMPRESSION:  Mildly dilated small bowel loops without abrupt transition point   identified, may reflect ileus. Early/developing obstruction should be   excluded clinically.    A/P  #Abdominal pain  #Hypokalemia  #Alcohol abuse  #Hx of anabolic steroid use  #Prophylactic measures    - Admit to medicine  - Given his history and presentation, I suspect PUD to be high on the differential. CT abd/pel with mildly dilated small bowel loops without abrupt transition point. Surgery consulted by ED, r/o ileus or sbo. Patient is a 22 year old male with hx of alcohol abuse, past surgical history of left hernia repair in childhood, presenting with epigastric and left upper quadrant pain. Patient states that he first noticed the pain about 2 months ago, however he reports the pain was excruciating today, prompting his ED visit. He describes it as a stabbing pain at the epigastric and LUQ area with radiation to the left lower back. He reports exacerbation when drinking coffee or if he has gone a long time without eating. He finds relief with food; however, the pain has left him with decreased appetite and he has noticed a 30 lb weight loss over past 2 months. He has associated nausea, but otherwise denies vomiting, chest pain, cough, diarrhea, constipation, hematemesis, hematochezia or melena. He reports about 5 bowel movements a day, predominantly solid stool. Patient is a , predominantly working with iron. He admits to anabolic steroid, purchased from the streets, use from Oct - May in order to "get bigger and stronger" for work. He also reports going on a cruise to [place] around 5 months ago, states he predominately ate food from the cruise, but had some local food; denies any illness from that trip. In addition, he reports hx of ulcers in his mother 2/2 H. pylori. He admits to heavy etoh use, previously drinking "half a bottle of Bhavin" but now drinks about 3 vodka shots on the weekends with additional beers. He does admit to drinking several beers daily over the past week as the bloating sensation helps with his abdominal discomfort.     In the ED, patient's vitals were stable. Labs significant for hypokalemia of 3.3. CT A/P with mildly dilated small bowel loops without abrupt transition point   identified, may reflect ileus. Early/developing obstruction should be excluded clinically. ED consulted surgery who doesn't believe there to be SBO or ileus; suspecting PUD, recommends conservative management.     ROS and PE as above     Labs, imaging and ekg reviewed  EKG with NSR. Nonspecific ST-T wave abnormalities. Prolonged Qtc 523.    CT Abdomen and Pelvis w/ IV Cont   IMPRESSION:  Mildly dilated small bowel loops without abrupt transition point   identified, may reflect ileus. Early/developing obstruction should be   excluded clinically.    A/P  #Abdominal pain  #Hypokalemia  #Alcohol abuse  #QT prolongation  #Hx of anabolic steroid use  #Weight loss  #Prophylactic measures    - Admit to medicine  - Given his history and presentation, I suspect PUD to be high on the differentials given his risk factors of smoking, alcohol use, and family hx of H.pylori. CT abd/pel with mildly dilated small bowel loops without abrupt transition point. Surgery consulted by ED, r/o ileus or sbo. Other differentials include gastritis & IBS from hx of steroid use  - Regular diet  - Avoid additional Zofran or Reglan for now as patient with elevated QTc  - Pain control  - f/u H.pylori, stool culture ova parasites  - GI consult; (patient states he was scheduled to see GI yesterday; however, unable to attend due to work  - Monitor for signs of alcohol withdrawal. Current CIWA 0. Will not place on standing meds.   - F/u repeat EKG as QTc noted to be prolonged. Patient does report of childhood cardiac monitoring, ?SVTs. If QTc continues to be prolonged during hospitalization, consider outpatient follow up. Avoid QT prolonging meds  - Weight loss mixed etiology of decreased appetite as well as abrupt cessation of steroid use.   - Given 7 month history of steroid use with cessation in May, f/u AM testosterone.   - Monitor and replete electrolytes prn   Remainder of management as above

## 2023-08-06 NOTE — H&P ADULT - SOCIAL HISTORY: SUBSTANCE USE
Vaping daily and using marijuana as well, denied use of other illicit drugs including Cocaine, Heroine, etc.

## 2023-08-07 LAB
ALBUMIN SERPL ELPH-MCNC: 3.6 G/DL — SIGNIFICANT CHANGE UP (ref 3.5–5)
ALP SERPL-CCNC: 58 U/L — SIGNIFICANT CHANGE UP (ref 40–120)
ALT FLD-CCNC: 40 U/L DA — SIGNIFICANT CHANGE UP (ref 10–60)
ANION GAP SERPL CALC-SCNC: 5 MMOL/L — SIGNIFICANT CHANGE UP (ref 5–17)
AST SERPL-CCNC: 16 U/L — SIGNIFICANT CHANGE UP (ref 10–40)
BASOPHILS # BLD AUTO: 0.06 K/UL — SIGNIFICANT CHANGE UP (ref 0–0.2)
BASOPHILS NFR BLD AUTO: 1.5 % — SIGNIFICANT CHANGE UP (ref 0–2)
BILIRUB SERPL-MCNC: 1.1 MG/DL — SIGNIFICANT CHANGE UP (ref 0.2–1.2)
BUN SERPL-MCNC: 12 MG/DL — SIGNIFICANT CHANGE UP (ref 7–18)
CALCIUM SERPL-MCNC: 8.4 MG/DL — SIGNIFICANT CHANGE UP (ref 8.4–10.5)
CHLORIDE SERPL-SCNC: 109 MMOL/L — HIGH (ref 96–108)
CO2 SERPL-SCNC: 27 MMOL/L — SIGNIFICANT CHANGE UP (ref 22–31)
CREAT SERPL-MCNC: 0.78 MG/DL — SIGNIFICANT CHANGE UP (ref 0.5–1.3)
CULTURE RESULTS: NO GROWTH — SIGNIFICANT CHANGE UP
EGFR: 128 ML/MIN/1.73M2 — SIGNIFICANT CHANGE UP
EOSINOPHIL # BLD AUTO: 0.1 K/UL — SIGNIFICANT CHANGE UP (ref 0–0.5)
EOSINOPHIL NFR BLD AUTO: 2.4 % — SIGNIFICANT CHANGE UP (ref 0–6)
ETHANOL SERPL-MCNC: 5 MG/DL — SIGNIFICANT CHANGE UP (ref 0–10)
GLUCOSE SERPL-MCNC: 90 MG/DL — SIGNIFICANT CHANGE UP (ref 70–99)
HCT VFR BLD CALC: 41.1 % — SIGNIFICANT CHANGE UP (ref 39–50)
HGB BLD-MCNC: 13.7 G/DL — SIGNIFICANT CHANGE UP (ref 13–17)
IMM GRANULOCYTES NFR BLD AUTO: 0.2 % — SIGNIFICANT CHANGE UP (ref 0–0.9)
LYMPHOCYTES # BLD AUTO: 1.49 K/UL — SIGNIFICANT CHANGE UP (ref 1–3.3)
LYMPHOCYTES # BLD AUTO: 36.2 % — SIGNIFICANT CHANGE UP (ref 13–44)
MAGNESIUM SERPL-MCNC: 2.3 MG/DL — SIGNIFICANT CHANGE UP (ref 1.6–2.6)
MCHC RBC-ENTMCNC: 28.8 PG — SIGNIFICANT CHANGE UP (ref 27–34)
MCHC RBC-ENTMCNC: 33.3 GM/DL — SIGNIFICANT CHANGE UP (ref 32–36)
MCV RBC AUTO: 86.3 FL — SIGNIFICANT CHANGE UP (ref 80–100)
MONOCYTES # BLD AUTO: 0.46 K/UL — SIGNIFICANT CHANGE UP (ref 0–0.9)
MONOCYTES NFR BLD AUTO: 11.2 % — SIGNIFICANT CHANGE UP (ref 2–14)
NEUTROPHILS # BLD AUTO: 2 K/UL — SIGNIFICANT CHANGE UP (ref 1.8–7.4)
NEUTROPHILS NFR BLD AUTO: 48.5 % — SIGNIFICANT CHANGE UP (ref 43–77)
NRBC # BLD: 0 /100 WBCS — SIGNIFICANT CHANGE UP (ref 0–0)
PHOSPHATE SERPL-MCNC: 2.5 MG/DL — SIGNIFICANT CHANGE UP (ref 2.5–4.5)
PLATELET # BLD AUTO: 225 K/UL — SIGNIFICANT CHANGE UP (ref 150–400)
POTASSIUM SERPL-MCNC: 3.8 MMOL/L — SIGNIFICANT CHANGE UP (ref 3.5–5.3)
POTASSIUM SERPL-SCNC: 3.8 MMOL/L — SIGNIFICANT CHANGE UP (ref 3.5–5.3)
PROT SERPL-MCNC: 6.4 G/DL — SIGNIFICANT CHANGE UP (ref 6–8.3)
RBC # BLD: 4.76 M/UL — SIGNIFICANT CHANGE UP (ref 4.2–5.8)
RBC # FLD: 13.4 % — SIGNIFICANT CHANGE UP (ref 10.3–14.5)
SODIUM SERPL-SCNC: 141 MMOL/L — SIGNIFICANT CHANGE UP (ref 135–145)
SPECIMEN SOURCE: SIGNIFICANT CHANGE UP
TESTOST FREE+TOTAL PANEL SERPL-MCNC: 459 NG/DL — SIGNIFICANT CHANGE UP (ref 300–836)
WBC # BLD: 4.12 K/UL — SIGNIFICANT CHANGE UP (ref 3.8–10.5)
WBC # FLD AUTO: 4.12 K/UL — SIGNIFICANT CHANGE UP (ref 3.8–10.5)

## 2023-08-07 PROCEDURE — 74181 MRI ABDOMEN W/O CONTRAST: CPT | Mod: 26

## 2023-08-07 PROCEDURE — 99232 SBSQ HOSP IP/OBS MODERATE 35: CPT

## 2023-08-07 PROCEDURE — 99222 1ST HOSP IP/OBS MODERATE 55: CPT

## 2023-08-07 RX ADMIN — PANTOPRAZOLE SODIUM 40 MILLIGRAM(S): 20 TABLET, DELAYED RELEASE ORAL at 06:05

## 2023-08-07 RX ADMIN — Medication 1 GRAM(S): at 17:29

## 2023-08-07 RX ADMIN — MORPHINE SULFATE 2 MILLIGRAM(S): 50 CAPSULE, EXTENDED RELEASE ORAL at 00:15

## 2023-08-07 RX ADMIN — Medication 650 MILLIGRAM(S): at 17:29

## 2023-08-07 RX ADMIN — Medication 650 MILLIGRAM(S): at 18:02

## 2023-08-07 RX ADMIN — Medication 1 GRAM(S): at 12:42

## 2023-08-07 RX ADMIN — Medication 1 GRAM(S): at 06:05

## 2023-08-07 RX ADMIN — PANTOPRAZOLE SODIUM 40 MILLIGRAM(S): 20 TABLET, DELAYED RELEASE ORAL at 17:29

## 2023-08-07 NOTE — PROGRESS NOTE ADULT - PROBLEM SELECTOR PLAN 1
- P/w epigastric and LUQ pain, soft stool, & loss of weight  - Endorsed using Testosterone injection daily from OCT 2022 for 7 months with sudden quit   - S/p LFTs & Lipase wnl  - S/p CT scan: Mildly dilated small bowel loops without abrupt transition point identified, may reflect ileus.  - Seen by Sx and Ileus and SBO ruled out.     - Most likely PUD   - S/p Pepcid, Toradol and Morphine   - C/w Protonix PO BID  - Held Zofran d/t EKG showed QTc of 520-->QTc prolongation  - MRCP pending f/u results   - Planning for EGD on 8/8.  NPO at midnight for EGD in AM.    - Stool cx, O&P, H. pylori stool & H. Pylori IgG/IgM pending-f/u results   - Sx-Dr. Johnson consulted, appreciated-no surgical intervention at this time   - GI-Dr. Zapata consulted, appreciated - P/w epigastric and LUQ pain, soft stool, & loss of weight  - Endorsed using Testosterone injection daily from OCT 2022 for 7 months with sudden quit   - S/p LFTs & Lipase wnl  - S/p CT scan: Mildly dilated small bowel loops without abrupt transition point identified, may reflect ileus.  - Seen by Sx and Ileus and SBO ruled out.     - Most likely PUD   - S/p Pepcid, Toradol and Morphine   - C/w Protonix PO BID  - Held Zofran d/t EKG showed QTc of 520-->QTc prolongation  - MRCP pending f/u results   - Planning for EGD on 8/8.  NPO at midnight for EGD in AM.    - S/p Stool cx NGTD  - O&P, H. pylori stool & H. Pylori IgG/IgM pending-f/u results   - Sx-Dr. Johnson consulted, appreciated-no surgical intervention at this time   - GI-Dr. Zapata consulted, appreciated - P/w epigastric and LUQ pain, soft stool, & loss of weight  - Endorsed using Testosterone injection daily from OCT 2022 for 7 months with sudden quit   - S/p LFTs & Lipase wnl  - S/p CT scan: Mildly dilated small bowel loops without abrupt transition point identified, may reflect ileus.  - Seen by Sx and Ileus and SBO ruled out.     - Most likely PUD   - S/p Pepcid, Toradol and Morphine   - C/w Protonix PO BID  - Held Zofran d/t EKG showed QTc of 520-->QTc prolongation  - S/p MRCP negative   - Planning for EGD on 8/8.  NPO at midnight for EGD in AM.    - S/p Stool cx NGTD  - O&P, H. pylori stool & H. Pylori IgG/IgM pending-f/u results   - Sx-Dr. Johnson consulted, appreciated-no surgical intervention at this time   - GI-Dr. Zapata consulted, appreciated

## 2023-08-07 NOTE — PROGRESS NOTE ADULT - PROBLEM SELECTOR PLAN 4
- P/w EKG showed QTc of 520.  H/o palpitation in childhood requiring evaluation with Holter monitoring  - S/p 2 doses of Zofran in ED  - Consider outpt CV f/u

## 2023-08-07 NOTE — PROGRESS NOTE ADULT - PROBLEM SELECTOR PLAN 5
H/o Heavy alcohol drinking in the past, cut down to 3 shots of Vodka and 3 bottles of bear on weekends  - Reported drinking beer 1-3 bottles daily in the past 5 days   - Stable, not in withdrawal  - S/p Blood alcohol wnl   - CIWA=0 this AM   - C/w CIWA and PRN Ativan

## 2023-08-07 NOTE — CONSULT NOTE ADULT - SUBJECTIVE AND OBJECTIVE BOX
[facility] GI CONSULTATION    Patient is a 22y old  Male who presents with a chief complaint of abdominal pain (06 Aug 2023 01:57)    HPI:  22 yrs old M, with pmhx of alcohol abuse, pshx hernia repair in childhood, presented with epigastrica and LUQ abdominal pain. Pt stated that the pain started about 2 months ago, explained as stabbing pain, mainly at the epigastric area with gradual worsening with increased in intensity and number of episodes, relatively improved with food, not controlled with Tylenol. He stated that the episode he experienced today was excruciating, radiating to the left lower back associated with mild nausea. Pt denied any vomiting, chest pain, headache, dizziness, urinary symptoms, constipation, hematemesis, hematochezia, melena. He reported of having multiple soft stools about 5 times daily for the same duration of time, along with loss of weight about 30 pounds in the last month. 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 He went on a Cruise to [place] about 5 months ago and had local food. He denied any sick contacts or similar symptoms in the family but stated that his mother was diagnose with the H. pylori in the past.   He has noticed dyspnea and palpitation for the same duration of time especially when climbing up the stairs. Reported of having palpitation episodes in childhood when he had Holter monitoring twice with the latest being done when he was 15 yrs old which was inconclusive.   Pt was a former heavy alcohol drinker, however had cut down in the past couple of years, currently he uses about 3 shots of Vodka and 3 bottles of beer on the weekends, however in the past few days, he tried having beer about 1-3 beers daily for the past 4-5 days as it makes him feel full. Pt is a former smoker as well, but has been Vaping daily and using marijuana as well, denied use of other illicit drugs including Cocaine, Heroine, etc.   Pt reported of Lung cancer in the grandfather however denied any other type of cancer including gastric/colon cancer.  (06 Aug 2023 01:57)        PMH/PSH:  PAST MEDICAL & SURGICAL HISTORY:  No pertinent past medical history      H/O hernia repair            FH:  FAMILY HISTORY:  FH: lung cancer (Grandparent)          MEDS:  MEDICATIONS  (STANDING):  pantoprazole    Tablet 40 milliGRAM(s) Oral every 12 hours  sucralfate 1 Gram(s) Oral four times a day    MEDICATIONS  (PRN):  acetaminophen     Tablet .. 650 milliGRAM(s) Oral every 6 hours PRN Mild Pain (1 - 3), Moderate Pain (4 - 6)  aluminum hydroxide/magnesium hydroxide/simethicone Suspension 30 milliLiter(s) Oral once PRN Dyspepsia  LORazepam   Injectable 2 milliGRAM(s) IV Push every 1 hour PRN CIWA-Ar score 8 or greater  morphine  - Injectable 2 milliGRAM(s) IV Push every 6 hours PRN Severe Pain (7 - 10)    Allergies    No Known Allergies    Intolerances          ROS: A detailed set of ROS were asked and negative except those outlined in GI HPI.  ______________________________________________________________________  PHYSICAL EXAM:  T(C): 36.3 (08-07-23 @ 04:50), Max: 37 (08-06-23 @ 15:19)  HR: 63 (08-07-23 @ 04:50)  BP: 119/75 (08-07-23 @ 04:50)  RR: 18 (08-07-23 @ 04:50)  SpO2: 100% (08-07-23 @ 04:50)  Wt(kg): --      GEN: NAD  HEENT: EOMI, conjunctivae anicteric, neck supple, moist mucous membranes  PULM: LSCTAB, no wheezing, rales, or rhonchi  CV: RRR, no m/r/b  GI: Soft, NT, ND; +BS in all four quadrants, no ascites, no Venegas's sign  MSK: SARMIENTO, no edema  NEURO: A&O x 3, no gross deficits  ______________________________________________________________________  LABS:                        13.7   4.12  )-----------( 225      ( 07 Aug 2023 08:57 )             41.1     08-07    141  |  109<H>  |  12  ----------------------------<  90  3.8   |  27  |  0.78    Ca    8.4      07 Aug 2023 08:57  Phos  2.5     08-07  Mg     2.3     08-07    TPro  6.4  /  Alb  3.6  /  TBili  1.1  /  DBili  x   /  AST  16  /  ALT  40  /  AlkPhos  58  08-07    LIVER FUNCTIONS - ( 07 Aug 2023 08:57 )  Alb: 3.6 g/dL / Pro: 6.4 g/dL / ALK PHOS: 58 U/L / ALT: 40 U/L DA / AST: 16 U/L / GGT: x           PT/INR - ( 05 Aug 2023 20:24 )   PT: 11.4 sec;   INR: 1.00 ratio         PTT - ( 05 Aug 2023 20:24 )  PTT:28.9 sec  ____________________________________________    IMAGING:    < from: CT Abdomen and Pelvis w/ IV Cont (08.05.23 @ 21:38) >   CT ABDOMEN AND PELVIS IC   ORDERED BY: FRANCESCO LINTON     PROCEDURE DATE:  08/05/2023          INTERPRETATION:  CLINICAL INFORMATION: Left-sided abdominal and flank   pain.    COMPARISON: None.    CONTRAST/COMPLICATIONS:  IV Contrast: Omnipaque 350  90 cc administered   10 cc discarded  Oral Contrast: NONE  Complications: None reported at time of study completion    PROCEDURE:  CT of the Abdomen and Pelvis was performed.  Sagittal and coronal reformats were performed.    FINDINGS:  LOWER CHEST: Within normal limits.    LIVER: Within normal limits.  BILE DUCTS: Normal caliber.  GALLBLADDER: Within normal limits.  SPLEEN: Within normal limits.  PANCREAS: Within normal limits.  ADRENALS: Within normal limits.  KIDNEYS/URETERS: Within normal limits.    BLADDER: Within normal limits.  REPRODUCTIVE ORGANS: Prostate within normal limits.    BOWEL: Mildly dilated small bowel loops predominantly in the left upper   quadrant without abrupt transition point identified. Appendix is normal.  PERITONEUM: No ascites.  VESSELS: Within normal limits.  RETROPERITONEUM/LYMPH NODES: No lymphadenopathy.  ABDOMINAL WALL: Within normal limits.  BONES: Within normal limits.    IMPRESSION:  Mildly dilated small bowel loops without abrupt transition point   identified, may reflect ileus. Early/developing obstruction should be   excluded clinically.    < end of copied text >

## 2023-08-07 NOTE — PROGRESS NOTE ADULT - ASSESSMENT
22 year old, Male, from home, with PMH of alcohol abuse, PSH of hernia repair in childhood.  Presented with epigastric and LUQ abdominal pain.  Pt stated the pain started about 2 months ago, explained as stabbing pain, mainly at the epigastric area with gradual worsening with increased intensity and number of episodes, relatively improved with food, not controlled with Tylenol.  Pt stated the episode he experienced today was excruciating, radiating to the left lower back and associated with mild nausea.  Pt reported having multiple soft stools approx. 5 times daily for the same duration of time, along with loss of weight about 30 pounds in the last month.    Of note, pt works in construction working with iron.  Pt stated he used to inject testosterone daily bought from the market Oct 2022, for 7 months. Pt reported recent travel via cruise to [place] approx. 5 months ago and had local food.     Pt denied any sick contacts or similar symptoms in the family but stated his mother was diagnosed with the H. pylori in the past.  Pt reported lung cancer in the grandfather however denied any other family history of cancer including gastric/colon cancer.     Pt repoprted dyspnea and palpitation for the same duration of time especially when climbing up the stairs.  Reported having palpitation episodes in childhood for which he had a Holter monitor twice with the latest being done when he was 15 yrs old , which was inconclusive.     Pt reported being a former heavy alcohol drinker, however cut down in the past couple of years, currently he uses about 3 shots of Vodka and 3 bottles of beer on the weekends, however in the past few days, he tried having beer about 1-3 beers daily for the past 4-5 days as it makes him feel full.  Pt reported being a former smoker as well, but has been vaping daily and using marijuana as well, denied use of other illicit drugs including Cocaine, Heroin, etc.     Admitted for abdominal pain assessment for suspected PUD.

## 2023-08-07 NOTE — PROGRESS NOTE ADULT - NS ATTEND AMEND GEN_ALL_CORE FT
22M PMH etoh abuse, PSHx L hernia repair p/w burning epigastric and LUQ pain of 2 months worsening. Admission for intractable abdominal pain.    CT Abdomen and Pelvis w/ IV Cont   IMPRESSION:  Mildly dilated small bowel loops without abrupt transition point identified, may reflect ileus. Early/developing obstruction should be excluded clinically.    AP:  Abdominal pain  Suspect PUD, gastritis  Hypokalemia  Prolonged QT  Etoh abuse    -obtain stool h pylori  -start ppi, pepcid, carafate  -appreciate GI consult for scope likely tmrw  -NPO mn  -obtain MRCP  -tylenol prn for pain  -avoid QT prolonging meds  -replete lytes  -can dc CIWA, pt not scoring

## 2023-08-07 NOTE — SBIRT NOTE ADULT - NSSBIRTALCPOSREINDET_GEN_A_CORE
Patient reports he only consumes alcohol over the weekend. Patient identified being a social drinker. Patient declined requiring any substance abuse resources/ referrals.  Patient was educated on the harms of excessive alcohol use.

## 2023-08-07 NOTE — PROGRESS NOTE ADULT - PROBLEM SELECTOR PLAN 2
- P/w K=3.3, most likely d/t GI loss   - Resolved s/p replacement   - Continue to monitor CMP in AM-f/u results

## 2023-08-07 NOTE — PROGRESS NOTE ADULT - SUBJECTIVE AND OBJECTIVE BOX
NP Note discussed with  primary attending    Patient is a 22y old  Male who presents with a chief complaint of abdominal pain (07 Aug 2023 13:09)      INTERVAL HPI/OVERNIGHT EVENTS: Pt reports, "I'm ok."  Denies HA, dizziness, SOB, CP or abdominal pain.  Reports LBM "8:53AM" formed, denies diarrhea.      MEDICATIONS  (STANDING):  pantoprazole    Tablet 40 milliGRAM(s) Oral every 12 hours  sucralfate 1 Gram(s) Oral four times a day    MEDICATIONS  (PRN):  acetaminophen     Tablet .. 650 milliGRAM(s) Oral every 6 hours PRN Mild Pain (1 - 3), Moderate Pain (4 - 6)  aluminum hydroxide/magnesium hydroxide/simethicone Suspension 30 milliLiter(s) Oral once PRN Dyspepsia  LORazepam   Injectable 2 milliGRAM(s) IV Push every 1 hour PRN CIWA-Ar score 8 or greater  morphine  - Injectable 2 milliGRAM(s) IV Push every 6 hours PRN Severe Pain (7 - 10)      __________________________________________________  REVIEW OF SYSTEMS:    CONSTITUTIONAL: No fever  EYES: No acute visual disturbances  NECK: No pain or stiffness  RESPIRATORY: No cough; No shortness of breath  CARDIOVASCULAR: No chest pain, no palpitations  GASTROINTESTINAL: No pain. No nausea or vomiting.  No diarrhea   NEUROLOGICAL: No headache or numbness, no tremors  MUSCULOSKELETAL: No joint pain, no muscle pain  GENITOURINARY: No dysuria, no frequency, no hesitancy  PSYCHIATRY: No depression , no anxiety  ALL OTHER  ROS negative        Vital Signs Last 24 Hrs  T(C): 36.3 (07 Aug 2023 04:50), Max: 37 (06 Aug 2023 15:19)  T(F): 97.3 (07 Aug 2023 04:50), Max: 98.6 (06 Aug 2023 15:19)  HR: 63 (07 Aug 2023 04:50) (63 - 98)  BP: 119/75 (07 Aug 2023 04:50) (119/75 - 140/81)  BP(mean): 94 (06 Aug 2023 15:19) (94 - 94)  RR: 18 (07 Aug 2023 04:50) (18 - 19)  SpO2: 100% (07 Aug 2023 04:50) (99% - 100%)    Parameters below as of 07 Aug 2023 04:50  Patient On (Oxygen Delivery Method): room air        ________________________________________________  PHYSICAL EXAM:  GENERAL: NAD  HEENT: Normocephalic;  conjunctivae and sclerae clear; moist mucous membranes   NECK : Supple  CHEST/LUNG: Clear to auscultation bilaterally with good air entry   HEART: S1 S2  regular; no murmurs, gallops or rubs  ABDOMEN: Soft, Nontender, Nondistended; Bowel sounds present x 4 quad  EXTREMITIES: No cyanosis; no edema; no calf tenderness  SKIN: Warm and dry; no rash  NERVOUS SYSTEM:  Awake and alert; Oriented to place, person and time; no new deficits    _________________________________________________  LABS:                        13.7   4.12  )-----------( 225      ( 07 Aug 2023 08:57 )             41.1     08-07    141  |  109<H>  |  12  ----------------------------<  90  3.8   |  27  |  0.78    Ca    8.4      07 Aug 2023 08:57  Phos  2.5     08-07  Mg     2.3     08-07    TPro  6.4  /  Alb  3.6  /  TBili  1.1  /  DBili  x   /  AST  16  /  ALT  40  /  AlkPhos  58  08-07    PT/INR - ( 05 Aug 2023 20:24 )   PT: 11.4 sec;   INR: 1.00 ratio         PTT - ( 05 Aug 2023 20:24 )  PTT:28.9 sec  Urinalysis Basic - ( 07 Aug 2023 08:57 )    Color: x / Appearance: x / SG: x / pH: x  Gluc: 90 mg/dL / Ketone: x  / Bili: x / Urobili: x   Blood: x / Protein: x / Nitrite: x   Leuk Esterase: x / RBC: x / WBC x   Sq Epi: x / Non Sq Epi: x / Bacteria: x      CAPILLARY BLOOD GLUCOSE            RADIOLOGY & ADDITIONAL TESTS:    Imaging Personally Reviewed:  YES    Consultant(s) Notes Reviewed:   YES    Care Discussed with Consultants :     Plan of care was discussed with patient and /or primary care giver; all questions and concerns were addressed and care was aligned with patient's wishes.

## 2023-08-07 NOTE — PROGRESS NOTE ADULT - PROBLEM SELECTOR PLAN 3
H/o testosterone use x 7 months from outside market, sudden quit   - Total testosterone pending-f/u results H/o testosterone use x 7 months from outside market, sudden quit   - Total testosterone wnl

## 2023-08-07 NOTE — CONSULT NOTE ADULT - ASSESSMENT
Patient is a 22M with a PMHx of ETOH use, hernia repair in childhood, who presented to the ED with abdominal pain. GI was consulted for abdominal pain.    Patient reports epigastric abdominal pain x 2 months, constant, characterized as "acid/burning" sensation, recently radiating to his LUQ and L back. He trialed PO zantac at home with relief. He also endorses pain relief after PO intake. On 8/5 after work, the pain was severe and unbearable, subsequently came to the ED with his girlfriend. He also reports diarrhea 5 times daily x 2 months, which he attributed to having a "fast metabolism", as well as unintentional weight loss of 30lbs over the past 2 months, as well as shortness of breath, palpitations, and excessive flatus. At time of eval, he denies cp, n/v, hematuria, hematochezia, and melena. No family hx of liver disease or colorectal cancers. Mom has a hx of h. pylori ulcer. No autoimmune illnesses. He does not take herbal supplements. He decreased ETOH intake from daily to only drinking on weekends, regularly drinks beer and 3-4 shots of vodka, denies hx of withdrawals, last drink was Thursday prior to admission. He previously smoked cigarettes x 11 yrs however quit in 2020, currently vapes 1 cartridge every 2 days. He quit cannabis inhalation 1g daily last month. Per chart review, he previously injected testosterone daily that he acquired from the market (10/2022) x 7 months, recently went on a cruise ot [place] 5 months ago at which time he had local food.  No prior EGD/colonoscopy.    In the ED, BAL <3 -> 5, lipase 366 (ULN), elevated TBili 1.3 and AST 41, K 3.3. Stool cultures pending. CTAP - mildly dilated small bowel loops predominantly in LUQ without abrupt transition point identified, no ascites, ? ileus or early/developing obstruction should be excluded clinically.     #Abdominal pain  #Diarrhea  #ETOH use  #Weight loss  Patient presented with intractable epigastric pain that has been ongoing x 2 months associated with diarrhea, having 5 BMs/day, unrelated to PO intake. His abdominal pain is relieved with PO zantac and PO intake. No prior EGD/colonoscopy. CTAP ? ileus vs developing obstruction however abd exam benign and patient endorses flatus and BMs at this time. Given his hx of active smoking, ETOH use, unintentional weight loss, and diarrhea, patient may benefit from endoscopic evaluation. Differentials for abdominal pain include peptic/duodenal ulcer vs esophagitis vs gastritis vs malignancy vs other. Of note, patient's lipase is within the upper limit normal 366 and there is no radiographic evidence of pancreatitis, however given his young age and persistent abdominal pain, would consider obtaining MRCP while hospitalized to r/o pancreatic divisum. As for his diarrhea, low suspicion for infectious etiology given no systemic signs of toxicity however O&P pending, would obtain further stool studies including GI PCR, cannot rule out inflammatory bowel disease, would pursue outpatient as diarrhea frequency and stool caliber are without change.     	- Please obtain MRCP w/ contrast to r/o divisum  	- Tentative EGD tomorrow, 8/8, with Dr. Alcala  	- Diet as tolerated now, CLD in the evening, NPO after midnight  	- AM labs: CBC, CMP, PT/INR  	- Please hold chemical DVT ppx if safe per primary team  	- Pain control, would avoid opiates if able given concern for ileus/developing obstruction per imaging  	- [facility] protocol  	- Outpatient GI follow up for colonoscopy given chronic diarrhea    This note and its recommendations herein are preliminary until such time as cosigned by an attending.    GI will continue to follow.  Thank you for this consult!

## 2023-08-08 VITALS
DIASTOLIC BLOOD PRESSURE: 86 MMHG | SYSTOLIC BLOOD PRESSURE: 121 MMHG | RESPIRATION RATE: 22 BRPM | OXYGEN SATURATION: 100 % | HEART RATE: 86 BPM

## 2023-08-08 LAB
ALBUMIN SERPL ELPH-MCNC: 3.8 G/DL — SIGNIFICANT CHANGE UP (ref 3.5–5)
ALP SERPL-CCNC: 70 U/L — SIGNIFICANT CHANGE UP (ref 40–120)
ALT FLD-CCNC: 39 U/L DA — SIGNIFICANT CHANGE UP (ref 10–60)
ANION GAP SERPL CALC-SCNC: 8 MMOL/L — SIGNIFICANT CHANGE UP (ref 5–17)
AST SERPL-CCNC: 16 U/L — SIGNIFICANT CHANGE UP (ref 10–40)
BILIRUB SERPL-MCNC: 0.4 MG/DL — SIGNIFICANT CHANGE UP (ref 0.2–1.2)
BUN SERPL-MCNC: 17 MG/DL — SIGNIFICANT CHANGE UP (ref 7–18)
CALCIUM SERPL-MCNC: 8.4 MG/DL — SIGNIFICANT CHANGE UP (ref 8.4–10.5)
CHLORIDE SERPL-SCNC: 109 MMOL/L — HIGH (ref 96–108)
CO2 SERPL-SCNC: 25 MMOL/L — SIGNIFICANT CHANGE UP (ref 22–31)
CREAT SERPL-MCNC: 0.84 MG/DL — SIGNIFICANT CHANGE UP (ref 0.5–1.3)
CULTURE RESULTS: SIGNIFICANT CHANGE UP
EGFR: 125 ML/MIN/1.73M2 — SIGNIFICANT CHANGE UP
GLUCOSE SERPL-MCNC: 104 MG/DL — HIGH (ref 70–99)
H PYLORI AB SER-ACNC: 29 UNITS — HIGH
H PYLORI AG STL QL: POSITIVE
H PYLORI IGA SER-ACNC: 49.7 UNITS — HIGH
HCT VFR BLD CALC: 42.7 % — SIGNIFICANT CHANGE UP (ref 39–50)
HGB BLD-MCNC: 14.3 G/DL — SIGNIFICANT CHANGE UP (ref 13–17)
INR BLD: 0.95 RATIO — SIGNIFICANT CHANGE UP (ref 0.85–1.18)
MAGNESIUM SERPL-MCNC: 2.3 MG/DL — SIGNIFICANT CHANGE UP (ref 1.6–2.6)
MCHC RBC-ENTMCNC: 29.3 PG — SIGNIFICANT CHANGE UP (ref 27–34)
MCHC RBC-ENTMCNC: 33.5 GM/DL — SIGNIFICANT CHANGE UP (ref 32–36)
MCV RBC AUTO: 87.5 FL — SIGNIFICANT CHANGE UP (ref 80–100)
MRSA PCR RESULT.: SIGNIFICANT CHANGE UP
NRBC # BLD: 0 /100 WBCS — SIGNIFICANT CHANGE UP (ref 0–0)
PHOSPHATE SERPL-MCNC: 3.5 MG/DL — SIGNIFICANT CHANGE UP (ref 2.5–4.5)
PLATELET # BLD AUTO: 247 K/UL — SIGNIFICANT CHANGE UP (ref 150–400)
POTASSIUM SERPL-MCNC: 3.6 MMOL/L — SIGNIFICANT CHANGE UP (ref 3.5–5.3)
POTASSIUM SERPL-SCNC: 3.6 MMOL/L — SIGNIFICANT CHANGE UP (ref 3.5–5.3)
PROT SERPL-MCNC: 6.7 G/DL — SIGNIFICANT CHANGE UP (ref 6–8.3)
PROTHROM AB SERPL-ACNC: 10.9 SEC — SIGNIFICANT CHANGE UP (ref 9.5–13)
RBC # BLD: 4.88 M/UL — SIGNIFICANT CHANGE UP (ref 4.2–5.8)
RBC # FLD: 13.2 % — SIGNIFICANT CHANGE UP (ref 10.3–14.5)
S AUREUS DNA NOSE QL NAA+PROBE: SIGNIFICANT CHANGE UP
SODIUM SERPL-SCNC: 142 MMOL/L — SIGNIFICANT CHANGE UP (ref 135–145)
SPECIMEN SOURCE: SIGNIFICANT CHANGE UP
WBC # BLD: 8.05 K/UL — SIGNIFICANT CHANGE UP (ref 3.8–10.5)
WBC # FLD AUTO: 8.05 K/UL — SIGNIFICANT CHANGE UP (ref 3.8–10.5)

## 2023-08-08 PROCEDURE — 86677 HELICOBACTER PYLORI ANTIBODY: CPT

## 2023-08-08 PROCEDURE — 43239 EGD BIOPSY SINGLE/MULTIPLE: CPT

## 2023-08-08 PROCEDURE — 87045 FECES CULTURE AEROBIC BACT: CPT

## 2023-08-08 PROCEDURE — 74177 CT ABD & PELVIS W/CONTRAST: CPT | Mod: MA

## 2023-08-08 PROCEDURE — 81001 URINALYSIS AUTO W/SCOPE: CPT

## 2023-08-08 PROCEDURE — 84100 ASSAY OF PHOSPHORUS: CPT

## 2023-08-08 PROCEDURE — 85025 COMPLETE CBC W/AUTO DIFF WBC: CPT

## 2023-08-08 PROCEDURE — 87641 MR-STAPH DNA AMP PROBE: CPT

## 2023-08-08 PROCEDURE — 87086 URINE CULTURE/COLONY COUNT: CPT

## 2023-08-08 PROCEDURE — 80048 BASIC METABOLIC PNL TOTAL CA: CPT

## 2023-08-08 PROCEDURE — 85730 THROMBOPLASTIN TIME PARTIAL: CPT

## 2023-08-08 PROCEDURE — 87046 STOOL CULTR AEROBIC BACT EA: CPT

## 2023-08-08 PROCEDURE — 88312 SPECIAL STAINS GROUP 1: CPT

## 2023-08-08 PROCEDURE — 36415 COLL VENOUS BLD VENIPUNCTURE: CPT

## 2023-08-08 PROCEDURE — 99285 EMERGENCY DEPT VISIT HI MDM: CPT | Mod: 25

## 2023-08-08 PROCEDURE — 87640 STAPH A DNA AMP PROBE: CPT

## 2023-08-08 PROCEDURE — 83735 ASSAY OF MAGNESIUM: CPT

## 2023-08-08 PROCEDURE — 88305 TISSUE EXAM BY PATHOLOGIST: CPT | Mod: 26

## 2023-08-08 PROCEDURE — 96375 TX/PRO/DX INJ NEW DRUG ADDON: CPT

## 2023-08-08 PROCEDURE — 74181 MRI ABDOMEN W/O CONTRAST: CPT

## 2023-08-08 PROCEDURE — 85027 COMPLETE CBC AUTOMATED: CPT

## 2023-08-08 PROCEDURE — 85652 RBC SED RATE AUTOMATED: CPT

## 2023-08-08 PROCEDURE — 87177 OVA AND PARASITES SMEARS: CPT

## 2023-08-08 PROCEDURE — 88305 TISSUE EXAM BY PATHOLOGIST: CPT

## 2023-08-08 PROCEDURE — 93005 ELECTROCARDIOGRAM TRACING: CPT

## 2023-08-08 PROCEDURE — 80307 DRUG TEST PRSMV CHEM ANLYZR: CPT

## 2023-08-08 PROCEDURE — 96374 THER/PROPH/DIAG INJ IV PUSH: CPT

## 2023-08-08 PROCEDURE — 80053 COMPREHEN METABOLIC PANEL: CPT

## 2023-08-08 PROCEDURE — 87077 CULTURE AEROBIC IDENTIFY: CPT

## 2023-08-08 PROCEDURE — 87338 HPYLORI STOOL AG IA: CPT

## 2023-08-08 PROCEDURE — 88312 SPECIAL STAINS GROUP 1: CPT | Mod: 26

## 2023-08-08 PROCEDURE — 84403 ASSAY OF TOTAL TESTOSTERONE: CPT

## 2023-08-08 PROCEDURE — 85610 PROTHROMBIN TIME: CPT

## 2023-08-08 PROCEDURE — 83690 ASSAY OF LIPASE: CPT

## 2023-08-08 DEVICE — ESOPHAGEAL BALLOON CATH CRE FIXED WIRE 6-7-8MM: Type: IMPLANTABLE DEVICE | Status: FUNCTIONAL

## 2023-08-08 DEVICE — ESOPHAGEAL BALLOON CATH CRE FIXED WIRE 10-11-12MM: Type: IMPLANTABLE DEVICE | Status: FUNCTIONAL

## 2023-08-08 DEVICE — ESOPHAGEAL BALLOON CATH CRE FIXED WIRE 8-9-10MM: Type: IMPLANTABLE DEVICE | Status: FUNCTIONAL

## 2023-08-08 RX ORDER — CLARITHROMYCIN 500 MG
1 TABLET ORAL
Qty: 28 | Refills: 0
Start: 2023-08-08 | End: 2023-08-21

## 2023-08-08 RX ORDER — PANTOPRAZOLE SODIUM 20 MG/1
1 TABLET, DELAYED RELEASE ORAL
Qty: 28 | Refills: 0
Start: 2023-08-08 | End: 2023-08-21

## 2023-08-08 RX ORDER — SUCRALFATE 1 G
1 TABLET ORAL
Qty: 28 | Refills: 0
Start: 2023-08-08 | End: 2023-08-21

## 2023-08-08 RX ORDER — AMOXICILLIN 250 MG/5ML
2 SUSPENSION, RECONSTITUTED, ORAL (ML) ORAL
Qty: 56 | Refills: 0
Start: 2023-08-08 | End: 2023-08-21

## 2023-08-08 RX ADMIN — Medication 1 GRAM(S): at 06:04

## 2023-08-08 RX ADMIN — Medication 1 GRAM(S): at 00:14

## 2023-08-08 RX ADMIN — MORPHINE SULFATE 2 MILLIGRAM(S): 50 CAPSULE, EXTENDED RELEASE ORAL at 00:14

## 2023-08-08 RX ADMIN — PANTOPRAZOLE SODIUM 40 MILLIGRAM(S): 20 TABLET, DELAYED RELEASE ORAL at 06:04

## 2023-08-08 RX ADMIN — Medication 1 GRAM(S): at 17:35

## 2023-08-08 RX ADMIN — MORPHINE SULFATE 2 MILLIGRAM(S): 50 CAPSULE, EXTENDED RELEASE ORAL at 06:12

## 2023-08-08 RX ADMIN — PANTOPRAZOLE SODIUM 40 MILLIGRAM(S): 20 TABLET, DELAYED RELEASE ORAL at 17:35

## 2023-08-08 RX ADMIN — MORPHINE SULFATE 2 MILLIGRAM(S): 50 CAPSULE, EXTENDED RELEASE ORAL at 01:00

## 2023-08-08 RX ADMIN — MORPHINE SULFATE 2 MILLIGRAM(S): 50 CAPSULE, EXTENDED RELEASE ORAL at 07:37

## 2023-08-08 NOTE — DIETITIAN INITIAL EVALUATION ADULT - PROBLEM SELECTOR PLAN 4
EKG showed QTc of 520   s/p 2 doses of Zofran in ED  hx of palpitation in childhood requiring evaluation with Holter monitoring  questionable if Zofran alone would cause prolongation   will repeat EKG in AM  will hold off on Antiemetics or other drugs causing QTc prolongation such as Zofran, Metoclopramide  may consider Ativan or other antiemetic based on pharmacy availability  Consider outpatient cardiology follow up if qtc remains elevated

## 2023-08-08 NOTE — DISCHARGE NOTE PROVIDER - NSDCFUADDINST_GEN_ALL_CORE_FT
Follow up with the Gastroenterologist in two weeks for the results of your biopsy and stool tests.

## 2023-08-08 NOTE — DIETITIAN INITIAL EVALUATION ADULT - PERTINENT LABORATORY DATA
08-08    142  |  109<H>  |  17  ----------------------------<  104<H>  3.6   |  25  |  0.84    Ca    8.4      08 Aug 2023 05:31  Phos  3.5     08-08  Mg     2.3     08-08    TPro  6.7  /  Alb  3.8  /  TBili  0.4  /  DBili  x   /  AST  16  /  ALT  39  /  AlkPhos  70  08-08

## 2023-08-08 NOTE — DISCHARGE NOTE PROVIDER - PROVIDER TOKENS
FREE:[LAST:[Clinic],FIRST:[Binh],PHONE:[phone]],FAX:[(   [phone]    -],ADDRESS:[[address] [place] [address]]] FREE:[LAST:[Clinic],FIRST:[Binh],PHONE:[phone]],FAX:[(   )    -],ADDRESS:[[address] [place] [address]]],PROVIDER:[TOKEN:[[id number]]]

## 2023-08-08 NOTE — DISCHARGE NOTE PROVIDER - NSDCMRMEDTOKEN_GEN_ALL_CORE_FT
amoxicillin 500 mg oral tablet: 2 tab(s) orally 2 times a day H. Pylori tx  clarithromycin 500 mg oral tablet: 1 tab(s) orally 2 times a day H. Pylori tx  pantoprazole 40 mg oral delayed release tablet: 1 tab(s) orally every 12 hours H. Pylori tx  sucralfate 1 g oral tablet: 1 tab(s) orally 2 times a day

## 2023-08-08 NOTE — DISCHARGE NOTE PROVIDER - HOSPITAL COURSE
22 year old, Male, from home, with PMH of alcohol abuse, PSH of hernia repair in childhood. Presented with epigastric and LUQ abdominal pain. Patient stated that the pain started about 2 months ago, explained as stabbing pain, mainly at the epigastric area with gradual worsening with increased intensity and number of episodes, relatively improved with food, not controlled with Tylenol.   Patient stated that the episode he experienced today was excruciating, radiating to the left lower back associated with mild nausea. Patient denied vomiting, chest pain, headache, dizziness, urinary symptoms, constipation, hematemesis, hematochezia, or melena.  Patient reported having multiple soft stools about 5 times daily for the same duration of time, along with loss of weight about 30 pounds in the last month. 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  Patient stated he used to inject testosterone daily bought from the market from Oct 2022, for 7 months.  Patient reported a recent cruise to [place] about 5 months ago and had local food.  Patient denied sick contacts or similar symptoms in the family but stated that his mother was diagnosed with H. pylori in the past. Patient reported dyspnea and palpitation for the same duration of time especially when climbing up the stairs. Reported having palpitation episodes since childhood when he had Holter monitoring twice with the latest being done when he was 15 yrs old which was inconclusive. Patient was a former heavy alcohol drinker, however had cut down in the past couple of years, currently he uses about 3 shots of Vodka and 3 bottles of beer on the weekends, however in the past few days, he tried having beer about 1-3 beers daily for the past 4-5 days as it makes him feel full.  Patient is a former smoker as well, but has been vaping daily and using marijuana as well, denied use of other illicit drugs including Cocaine, Heroin, etc. Patient reported lung cancer in his grandfather however denied any other type of cancer including gastric/colon cancer.       THIS IS A BRIEF SUMMARY.  FOR A FULL HOSPITAL COURSE SEE MEDICAL RECORDS OR [facility] PORTAL.        Patient is medically cleared by Attending for discharge.   22 year old, Male, from home, with PMH of alcohol abuse, PSH of hernia repair in childhood. Presented with epigastric and LUQ abdominal pain. Patient stated that the pain started about 2 months ago, explained as stabbing pain, mainly at the epigastric area with gradual worsening with increased intensity and number of episodes, relatively improved with food, not controlled with Tylenol.   Patient stated that the episode he experienced today was excruciating, radiating to the left lower back associated with mild nausea. Patient denied vomiting, chest pain, headache, dizziness, urinary symptoms, constipation, hematemesis, hematochezia, or melena.  Patient reported having multiple soft stools about 5 times daily for the same duration of time, along with loss of weight about 30 pounds in the last month. 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  Patient stated he used to inject testosterone daily bought from the market from Oct 2022, for 7 months.  Patient reported a recent cruise to [place] about 5 months ago and had local food.  Patient denied sick contacts or similar symptoms in the family but stated that his mother was diagnosed with H. pylori in the past. Patient reported dyspnea and palpitation for the same duration of time especially when climbing up the stairs. Reported having palpitation episodes since childhood when he had Holter monitoring twice with the latest being done when he was 15 yrs old which was inconclusive. Patient was a former heavy alcohol drinker, however had cut down in the past couple of years, currently he uses about 3 shots of Vodka and 3 bottles of beer on the weekends, however in the past few days, he tried having beer about 1-3 beers daily for the past 4-5 days as it makes him feel full.  Patient is a former smoker as well, but has been vaping daily and using marijuana as well, denied use of other illicit drugs including Cocaine, Heroin, etc. Patient reported lung cancer in his grandfather however denied any other type of cancer including gastric/colon cancer.  Admitted for abdominal pain workup.        THIS IS A BRIEF SUMMARY.  FOR A FULL HOSPITAL COURSE SEE MEDICAL RECORDS OR [facility] PORTAL.        Patient is medically cleared by Attending for discharge.

## 2023-08-08 NOTE — DISCHARGE NOTE PROVIDER - NSDCCPCAREPLAN_GEN_ALL_CORE_FT
PRINCIPAL DISCHARGE DIAGNOSIS  Diagnosis: Abdominal pain  Assessment and Plan of Treatment: You presented to the hospital with reported epigastric, LUQ pain, soft stool, & loss of weight.    Your liver enzymes and lipase level were normal.  Your CT scan showed mildly dilated small bowel loops without abrupt transition point identified, may reflect ileus.  While in the hospital you were seen by a Surgeon and Ileus and SBO were ruled out.  You tolerated your diet and continued to have bowel movements.  Your symptoms were attributed to PUD.  While in the hospital you were seen by a Gastroenterologist with a recommendation for a MRCP & EGD.  On 8/7 you had a MRCP that was negative.  On 8/8 your had an EGD that showed......................................   Your stool was cultured for infection and has been negative to date.  Your stool tests for - O&P, H. pylori stool & H. Pylori IgG/IgM pending-f/u results.........         SECONDARY DISCHARGE DIAGNOSES  Diagnosis: QT prolongation  Assessment and Plan of Treatment: On admission your EKG showed QTc of 520.  Which is abnormal and know as QT prolongation.  You reported a history of palpitations since childhood for which your were evaluated with Holter monitoring.  Please follow up with your PCP and/or Cardiologist to discuss this information and for continued evaluation and management.  Please follow up with your PCP and/or Cardiologist within one week.        Diagnosis: Hypokalemia  Assessment and Plan of Treatment: On admission your K=3.3, this was most likely d/t GI loss.  While in the hospital your potassium was supplemented and normalized.    You had hypokalemia that means your blood was low in potassium.  Potassium helps your muscle cells work properly, including your heart muscle.  Monitor for symtoms such as muscle cramps, or muscle twitches.  You are encouraged to have your potassium checked regularly since you've had hypokalemia.      Diagnosis: History of anabolic steroid use  Assessment and Plan of Treatment: You reported a history of testosterone use x 7 months.  The use of testerone injections is not recommended unless under the care and instruction of a doctor.        Diagnosis: Alcohol abuse  Assessment and Plan of Treatment: You reported a history of heavy alcohol drinking and a recent resumption in drinking alcohol.  On admisssion your blood alcohol was within normal linits.  While in the hospital you were monitored and did not require alcohol withdrawal treatment.  Please resume your home alcohol cessation regimen.         PRINCIPAL DISCHARGE DIAGNOSIS  Diagnosis: Abdominal pain  Assessment and Plan of Treatment: You presented to the hospital with reported epigastric, LUQ pain, soft stool, & loss of weight.    Your liver enzymes and lipase level were normal.  Your CT scan showed mildly dilated small bowel loops without abrupt transition point identified, may reflect ileus.  While in the hospital you were seen by a Surgeon and Ileus and SBO were ruled out.  You tolerated your diet and continued to have bowel movements.  Your symptoms were attributed to PUD.  While in the hospital you were seen by a Gastroenterologist with a recommendation for a MRCP & EGD.  On 8/7 you had a MRCP that was negative.  On 8/8 your had an EGD that showed stomach irritation which was biopsied.  Please follow up with the Gasteroenterologist in two weeks for the results of your stomach biopsy.  Your stool was cultured for infection and has been negative to date.  Your stool tests for - O&P, H. pylori stool & H. Pylori IgG/IgM pending-f/u results.........         SECONDARY DISCHARGE DIAGNOSES  Diagnosis: QT prolongation  Assessment and Plan of Treatment: On admission your EKG showed QTc of 520.  Which is abnormal and know as QT prolongation.  You reported a history of palpitations since childhood for which your were evaluated with Holter monitoring.  Please follow up with your PCP and/or Cardiologist to discuss this information and for continued evaluation and management.  Please follow up with your PCP and/or Cardiologist within one week.        Diagnosis: Hypokalemia  Assessment and Plan of Treatment: On admission your K=3.3, this was most likely d/t GI loss.  While in the hospital your potassium was supplemented and normalized.    You had hypokalemia that means your blood was low in potassium.  Potassium helps your muscle cells work properly, including your heart muscle.  Monitor for symtoms such as muscle cramps, or muscle twitches.  You are encouraged to have your potassium checked regularly since you've had hypokalemia.      Diagnosis: History of anabolic steroid use  Assessment and Plan of Treatment: You reported a history of testosterone use x 7 months.  The use of testerone injections is not recommended unless under the care and instruction of a doctor.        Diagnosis: Alcohol abuse  Assessment and Plan of Treatment: You reported a history of heavy alcohol drinking and a recent resumption in drinking alcohol.  On admisssion your blood alcohol was within normal linits.  While in the hospital you were monitored and did not require alcohol withdrawal treatment.  Please resume your home alcohol cessation regimen.         PRINCIPAL DISCHARGE DIAGNOSIS  Diagnosis: Helicobacter pylori (H. pylori) infection  Assessment and Plan of Treatment: You presented to the hospital with reported epigastric, LUQ pain, soft stool, & loss of weight.  Your H. pylori stool test was positive.          SECONDARY DISCHARGE DIAGNOSES  Diagnosis: Abdominal pain  Assessment and Plan of Treatment: You presented to the hospital with reported epigastric, LUQ pain, soft stool, & loss of weight.    Your liver enzymes and lipase level were normal.  Your CT scan showed mildly dilated small bowel loops without abrupt transition point identified, may reflect ileus.  While in the hospital you were seen by a Surgeon and Ileus and SBO were ruled out.  You tolerated your diet and continued to have bowel movements.  Your symptoms were attributed to PUD.  While in the hospital you were seen by a Gastroenterologist with a recommendation for a MRCP & EGD.  On 8/7 you had a MRCP that was negative.  On 8/8 your had an EGD that showed stomach irritation which was biopsied.  Please follow up with the Gasteroenterologist in two weeks for the results of your stomach biopsy.  Your stool was cultured for infection and has been negative to date.  Your stool tests for Ova & Parasite, and H. Pylori IgG/IgM are still testing.  Please follow up with the Gasteroenterologist in two weeks for the results of these tests.    Diagnosis: QT prolongation  Assessment and Plan of Treatment: On admission your EKG showed QTc of 520.  Which is abnormal and know as QT prolongation.  You reported a history of palpitations since childhood for which your were evaluated with Holter monitoring.  Please follow up with your PCP and/or Cardiologist to discuss this information and for continued evaluation and management.  Please follow up with your PCP and/or Cardiologist within one week.        Diagnosis: Hypokalemia  Assessment and Plan of Treatment: On admission your K=3.3, this was most likely d/t GI loss.  While in the hospital your potassium was supplemented and normalized.    You had hypokalemia that means your blood was low in potassium.  Potassium helps your muscle cells work properly, including your heart muscle.  Monitor for symtoms such as muscle cramps, or muscle twitches.  You are encouraged to have your potassium checked regularly since you've had hypokalemia.      Diagnosis: History of anabolic steroid use  Assessment and Plan of Treatment: You reported a history of testosterone use x 7 months.  The use of testerone injections is not recommended unless under the care and instruction of a doctor.        Diagnosis: Alcohol abuse  Assessment and Plan of Treatment: You reported a history of heavy alcohol drinking and a recent resumption in drinking alcohol.  On admisssion your blood alcohol was within normal linits.  While in the hospital you were monitored and did not require alcohol withdrawal treatment.  Please resume your home alcohol cessation regimen.         PRINCIPAL DISCHARGE DIAGNOSIS  Diagnosis: Helicobacter pylori (H. pylori) infection  Assessment and Plan of Treatment: You presented to the hospital with reported epigastric, LUQ pain, soft stool, & loss of weight.  Your H. pylori stool test was positive.  Your stool was positive for H. Pylori.  H. Pylori is a bacteria found in the stomach that causes symptoms of stomach pain, bloating, aching, burning, nausea, loss of appetite and weight loss.  If untreated symptoms could progress to stomach ulcers, chronic stomach inflammation, or intestinal inflammation.  You will continue and complete triple treatment for H. pylori.  Please follow up with the Gastroenterologist in two weeks for the results of your biopsy and stool tests.      SECONDARY DISCHARGE DIAGNOSES  Diagnosis: Abdominal pain  Assessment and Plan of Treatment: You presented to the hospital with reported epigastric, LUQ pain, soft stool, & loss of weight.    Your liver enzymes and lipase level were normal.  Your CT scan showed mildly dilated small bowel loops without abrupt transition point identified, may reflect ileus.  While in the hospital you were seen by a Surgeon and Ileus and SBO were ruled out.  You tolerated your diet and continued to have bowel movements.  Your symptoms were attributed to PUD.  While in the hospital you were seen by a Gastroenterologist with a recommendation for a MRCP & EGD.  On 8/7 you had a MRCP that was negative.  On 8/8 your had an EGD that showed stomach irritation which was biopsied.  Please follow up with the Gasteroenterologist in two weeks for the results of your stomach biopsy.  Your stool was cultured for infection and has been negative to date.  Your stool tests for Ova & Parasite, and H. Pylori IgG/IgM are still testing.  Please follow up with the Gasteroenterologist in two weeks for the results of these tests.    Diagnosis: QT prolongation  Assessment and Plan of Treatment: On admission your EKG showed QTc of 520.  Which is abnormal and know as QT prolongation.  You reported a history of palpitations since childhood for which your were evaluated with Holter monitoring.  Please follow up with your PCP and/or Cardiologist to discuss this information and for continued evaluation and management.  Please follow up with your PCP and/or Cardiologist within one week.        Diagnosis: Hypokalemia  Assessment and Plan of Treatment: On admission your K=3.3, this was most likely d/t GI loss.  While in the hospital your potassium was supplemented and normalized.    You had hypokalemia that means your blood was low in potassium.  Potassium helps your muscle cells work properly, including your heart muscle.  Monitor for symtoms such as muscle cramps, or muscle twitches.  You are encouraged to have your potassium checked regularly since you've had hypokalemia.      Diagnosis: History of anabolic steroid use  Assessment and Plan of Treatment: You reported a history of testosterone use x 7 months.  The use of testerone injections is not recommended unless under the care and instruction of a doctor.        Diagnosis: Alcohol abuse  Assessment and Plan of Treatment: You reported a history of heavy alcohol drinking and a recent resumption in drinking alcohol.  On admisssion your blood alcohol was within normal linits.  While in the hospital you were monitored and did not require alcohol withdrawal treatment.  Please resume your home alcohol cessation regimen.         PRINCIPAL DISCHARGE DIAGNOSIS  Diagnosis: Helicobacter pylori (H. pylori) infection  Assessment and Plan of Treatment: You presented to the hospital with reported epigastric, LUQ pain, soft stool, & loss of weight.  Your H. pylori stool test was positive.  Your stool was positive for H. Pylori.  H. Pylori is a bacteria found in the stomach that causes symptoms of stomach pain, bloating, aching, burning, nausea, loss of appetite and weight loss.  If untreated symptoms could progress to stomach ulcers, chronic stomach inflammation, or intestinal inflammation.  You will continue and complete triple treatment for H. pylori.  Please follow up with the Gastroenterologist in two weeks for the results of your biopsy and stool tests.      SECONDARY DISCHARGE DIAGNOSES  Diagnosis: Abdominal pain  Assessment and Plan of Treatment: You presented to the hospital with reported epigastric, LUQ pain, soft stool, & loss of weight.    Your liver enzymes and lipase level were normal.  Your CT scan showed mildly dilated small bowel loops without abrupt transition point identified, may reflect ileus.  While in the hospital you were seen by a Surgeon and Ileus and SBO were ruled out.  You tolerated your diet and continued to have bowel movements.  Your symptoms were attributed to PUD.  While in the hospital you were seen by a Gastroenterologist with a recommendation for a MRCP & EGD.  On 8/7 you had a MRCP that was negative.  On 8/8 your had an EGD that showed stomach irritation which was biopsied.  Please follow up with the Gasteroenterologist in two weeks for the results of your stomach biopsy.  Your stool was cultured for infection and has been negative to date.  Your stool tests for Ova & Parasite, and H. Pylori IgG/IgM are still testing.  Please follow up with the Gasteroenterologist in two weeks for the results of these tests.    Diagnosis: QT prolongation  Assessment and Plan of Treatment: On admission your EKG showed QTc of 520.  Which is abnormal and known as QT prolongation.  You reported a history of palpitations since childhood for which your were evaluated with Holter monitoring.  Please follow up with your PCP and/or Cardiologist to discuss this information and for continued evaluation and management.  Please follow up with your PCP and/or Cardiologist within one week.        Diagnosis: Hypokalemia  Assessment and Plan of Treatment: On admission your K=3.3, this was most likely d/t GI loss.  While in the hospital your potassium was supplemented and normalized.    You had hypokalemia that means your blood was low in potassium.  Potassium helps your muscle cells work properly, including your heart muscle.  Monitor for symtoms such as muscle cramps, or muscle twitches.  You are encouraged to have your potassium checked regularly since you've had hypokalemia.      Diagnosis: History of anabolic steroid use  Assessment and Plan of Treatment: You reported a history of testosterone use x 7 months.  The use of testerone injections is not recommended unless under the care and instruction of a doctor.        Diagnosis: Alcohol abuse  Assessment and Plan of Treatment: You reported a history of heavy alcohol drinking and a recent resumption in drinking alcohol.  On admisssion your blood alcohol was within normal linits.  While in the hospital you were monitored and did not require alcohol withdrawal treatment.  Please resume your home alcohol cessation regimen.

## 2023-08-08 NOTE — DISCHARGE NOTE NURSING/CASE MANAGEMENT/SOCIAL WORK - PATIENT PORTAL LINK FT
You can access the FollowMyHealth Patient Portal offered by [facility] by registering at the following website: [email or web address]. By joining [employer] FollowMyHealth portal, you will also be able to view your health information using other applications (apps) compatible with our system.

## 2023-08-08 NOTE — DISCHARGE NOTE NURSING/CASE MANAGEMENT/SOCIAL WORK - CAREGIVER PHONE NUMBER
Quality 130: Documentation Of Current Medications In The Medical Record: Current Medications Documented Detail Level: Detailed [id number]

## 2023-08-08 NOTE — DISCHARGE NOTE PROVIDER - CARE PROVIDER_API CALL
[facility]  [address] [address]  Phone: [phone]  Fax: (   )    -  Follow Up Time:    [address] [address]  Phone: [phone]  Fax: (   )    -  Follow Up Time:     JADYN PARKINSON  [address] [place] [address]  Phone: ()-  Fax: ()-  Follow Up Time:

## 2023-08-08 NOTE — CHART NOTE - NSCHARTNOTEFT_GEN_A_CORE
NP called [facility] pharmacy and spoke w/ Acosta to verify meds would be available for pickup.      Per Jorge pt has not been to their pharmacy and they do not have pt's insurance information on file.    NP informed pt and asked pt to call pharmacy to provide insurance information.  Pt verbalized understanding. NP called [facility] pharmacy and spoke w/ PharmacistAkosua to verify meds would be available for pickup.      Per Jorge pt has not been to their pharmacy and they do not have pt's insurance information on file.    NP informed pt and asked pt to call pharmacy to provide insurance information.  Pt verbalized understanding.    Pt informed NP that he called the Pharmacy and spoke with Jorge.  Pt stated that he authorized meds to be billed as cash and he will provide his insurance card when he picks up meds.  NP verbalized understanding.

## 2023-08-08 NOTE — DIETITIAN NUTRITION RISK NOTIFICATION - TREATMENT: THE FOLLOWING DIET HAS BEEN RECOMMENDED
Diet, NPO:   Except Medications (08-07-23 @ 19:53) [Active]  Diet, NPO after Midnight:      NPO Start Date: 07-Aug-2023,   NPO Start Time: 23:59 (08-07-23 @ 17:09) [Active]

## 2023-08-08 NOTE — DIETITIAN INITIAL EVALUATION ADULT - PROBLEM SELECTOR PLAN 5
hx of heavy alcohol drinking in the past, cut down to 3 shots of Vodka and 3 bottles of bear on weekends  started drinking beer 1-3 bottles daily in the past 5 days   not in withdrawal, CIWA score 0 as per writer's exam   ciwa protocol with PRN ativan 2 mg IV every 1 hr for CIWA score greater than 8   f/u alcohol blood in AM

## 2023-08-08 NOTE — DIETITIAN INITIAL EVALUATION ADULT - SIGNS/SYMPTOMS
wt loss of ~ 36 lbs x 2 months, (19 %), PO intake ~ 50 % of meal x 2 months,  Loss of Muscle mass (

## 2023-08-08 NOTE — DIETITIAN INITIAL EVALUATION ADULT - NS FNS DIET ORDER
Diet, NPO:   Except Medications (08-07-23 @ 19:53)  Diet, NPO after Midnight:      NPO Start Date: 07-Aug-2023,   NPO Start Time: 23:59 (08-07-23 @ 17:09)

## 2023-08-08 NOTE — DISCHARGE NOTE PROVIDER - DETAILS OF MALNUTRITION DIAGNOSIS/DIAGNOSES
This patient has been assessed with a concern for Malnutrition and was treated during this hospitalization for the following Nutrition diagnosis/diagnoses:     -  08/08/2023: Severe protein-calorie malnutrition

## 2023-08-08 NOTE — DIETITIAN INITIAL EVALUATION ADULT - PERTINENT MEDS FT
MEDICATIONS  (STANDING):  pantoprazole    Tablet 40 milliGRAM(s) Oral every 12 hours  sucralfate 1 Gram(s) Oral four times a day    MEDICATIONS  (PRN):  acetaminophen     Tablet .. 650 milliGRAM(s) Oral every 6 hours PRN Mild Pain (1 - 3), Moderate Pain (4 - 6)  aluminum hydroxide/magnesium hydroxide/simethicone Suspension 30 milliLiter(s) Oral once PRN Dyspepsia  morphine  - Injectable 2 milliGRAM(s) IV Push every 6 hours PRN Severe Pain (7 - 10)

## 2023-08-08 NOTE — DIETITIAN INITIAL EVALUATION ADULT - OTHER INFO
Pt visited. Pt Reports Appetite improving in Hospital. Per Pt PTA Loss of appetite x 2 months d/t abdominal Pain x 2 months . Pt was eating less. Pt was drinking ensure 4 cans / day at Home. That was  giving him loose stools. At home Pt with loose stool x 5  day. Per Pt Ht 6 feet,   LB 2 months ago, current wt 150 LB. Wt loss of ~ 36 lbs x 2 months ( 19 %). NFPE  Performed.  When diet is advanced will avoid spicy and citrus food. Also Pt  Requesting for Ensure plus Likes chocolate flavour.  Pt agreed to try Large Portions.

## 2023-08-08 NOTE — DIETITIAN INITIAL EVALUATION ADULT - PROBLEM SELECTOR PLAN 1
p/w epigastric and LUQ pain , soft stool, loss of weight  in the ED: afebrile, , /75, Sat 99% on RA  endorsed using Testosterone injection daily from OCT 2022 for 7 months with sudden quit   no leukocytosis, neg lipase,   LFTs relatively normal   CT scan: Mildly dilated small bowel loops without abrupt transition point identified, may reflect ileus.  likely PUD [duodenal] 2/2 H. pylori vs Gastritis vs IBS 2/2 steroids use and quit hormonal therapy  vs SMA syndrome  [studies show low testosterone correlation with IBS]   s/p Pepcid, Toradol and Morphine   c/w PPI PO BID  will hold off on Zofran as EKG showed QTc of 520 - to reassess with EKG if antiemetic required   f/u H. pylori Ab in stool and blood   f/u stool culture, ova and parasite  Surgery consulted by ED - no surgical intervention at this time   GI consult in AM for possible EGD

## 2023-08-09 LAB
CULTURE RESULTS: SIGNIFICANT CHANGE UP
SPECIMEN SOURCE: SIGNIFICANT CHANGE UP

## 2023-08-10 LAB — SURGICAL PATHOLOGY STUDY: SIGNIFICANT CHANGE UP

## 2025-04-30 ENCOUNTER — EMERGENCY (EMERGENCY)
Facility: HOSPITAL | Age: 25
LOS: 1 days | End: 2025-04-30
Attending: STUDENT IN AN ORGANIZED HEALTH CARE EDUCATION/TRAINING PROGRAM
Payer: COMMERCIAL

## 2025-04-30 VITALS
TEMPERATURE: 98 F | RESPIRATION RATE: 15 BRPM | DIASTOLIC BLOOD PRESSURE: 81 MMHG | OXYGEN SATURATION: 96 % | HEART RATE: 91 BPM | WEIGHT: 199.96 LBS | SYSTOLIC BLOOD PRESSURE: 155 MMHG

## 2025-04-30 DIAGNOSIS — Z98.890 OTHER SPECIFIED POSTPROCEDURAL STATES: Chronic | ICD-10-CM

## 2025-04-30 PROCEDURE — 99283 EMERGENCY DEPT VISIT LOW MDM: CPT

## 2025-04-30 PROCEDURE — 99284 EMERGENCY DEPT VISIT MOD MDM: CPT | Mod: 25

## 2025-04-30 PROCEDURE — 65205 REMOVE FOREIGN BODY FROM EYE: CPT | Mod: LT

## 2025-04-30 RX ORDER — FLUORESCEIN SODIUM 0.6 MG
1 STRIP OPHTHALMIC (EYE) ONCE
Refills: 0 | Status: COMPLETED | OUTPATIENT
Start: 2025-04-30 | End: 2025-04-30

## 2025-04-30 RX ORDER — TETRACAINE HYDROCHLORIDE 5 MG/ML
1 SOLUTION OPHTHALMIC ONCE
Refills: 0 | Status: COMPLETED | OUTPATIENT
Start: 2025-04-30 | End: 2025-04-30

## 2025-04-30 RX ORDER — OFLOXACIN 3 MG/ML
1 SOLUTION OPHTHALMIC ONCE
Refills: 0 | Status: COMPLETED | OUTPATIENT
Start: 2025-04-30 | End: 2025-04-30

## 2025-04-30 RX ADMIN — TETRACAINE HYDROCHLORIDE 1 DROP(S): 5 SOLUTION OPHTHALMIC at 13:41

## 2025-04-30 RX ADMIN — Medication 1 APPLICATION(S): at 13:41

## 2025-04-30 RX ADMIN — OFLOXACIN 1 DROP(S): 3 SOLUTION OPHTHALMIC at 14:12

## (undated) DEVICE — SOL INJ NS 0.9% 500ML 1-PORT

## (undated) DEVICE — BITE BLOCK ADULT 20 X 27MM (GREEN)

## (undated) DEVICE — BIOPSY FORCEP RADIAL JAW 4 STANDARD WITH NEEDLE

## (undated) DEVICE — TUBING CANNULA SALTER LABS NASAL ADULT 7FT

## (undated) DEVICE — KIT ENDO PROCEDURE CUST W/VLV

## (undated) DEVICE — Device

## (undated) DEVICE — VENODYNE/SCD SLEEVE CALF MEDIUM

## (undated) DEVICE — CONTAINER FORMALIN 10% 20ML

## (undated) DEVICE — SOLIDIFIER ISOLYZER 2000 CC